# Patient Record
Sex: MALE | Race: OTHER
[De-identification: names, ages, dates, MRNs, and addresses within clinical notes are randomized per-mention and may not be internally consistent; named-entity substitution may affect disease eponyms.]

---

## 2018-05-21 ENCOUNTER — HOSPITAL ENCOUNTER (INPATIENT)
Dept: HOSPITAL 72 - EMR | Age: 53
LOS: 7 days | Discharge: HOME | DRG: 720 | End: 2018-05-28
Payer: COMMERCIAL

## 2018-05-21 VITALS — HEIGHT: 68 IN | BODY MASS INDEX: 27.58 KG/M2 | WEIGHT: 182 LBS

## 2018-05-21 DIAGNOSIS — J18.9: ICD-10-CM

## 2018-05-21 DIAGNOSIS — E75.5: ICD-10-CM

## 2018-05-21 DIAGNOSIS — A41.9: Primary | ICD-10-CM

## 2018-05-21 DIAGNOSIS — J98.4: ICD-10-CM

## 2018-05-21 DIAGNOSIS — F17.200: ICD-10-CM

## 2018-05-21 PROCEDURE — 86039 ANTINUCLEAR ANTIBODIES (ANA): CPT

## 2018-05-21 PROCEDURE — 86703 HIV-1/HIV-2 1 RESULT ANTBDY: CPT

## 2018-05-21 PROCEDURE — 99285 EMERGENCY DEPT VISIT HI MDM: CPT

## 2018-05-21 PROCEDURE — 82550 ASSAY OF CK (CPK): CPT

## 2018-05-21 PROCEDURE — 84484 ASSAY OF TROPONIN QUANT: CPT

## 2018-05-21 PROCEDURE — 80202 ASSAY OF VANCOMYCIN: CPT

## 2018-05-21 PROCEDURE — 85025 COMPLETE CBC W/AUTO DIFF WBC: CPT

## 2018-05-21 PROCEDURE — 87385 HISTOPLASMA CAPSUL AG IA: CPT

## 2018-05-21 PROCEDURE — 86140 C-REACTIVE PROTEIN: CPT

## 2018-05-21 PROCEDURE — 87205 SMEAR GRAM STAIN: CPT

## 2018-05-21 PROCEDURE — 81003 URINALYSIS AUTO W/O SCOPE: CPT

## 2018-05-21 PROCEDURE — 83036 HEMOGLOBIN GLYCOSYLATED A1C: CPT

## 2018-05-21 PROCEDURE — 85651 RBC SED RATE NONAUTOMATED: CPT

## 2018-05-21 PROCEDURE — 86021 WBC ANTIBODY IDENTIFICATION: CPT

## 2018-05-21 PROCEDURE — 36415 COLL VENOUS BLD VENIPUNCTURE: CPT

## 2018-05-21 PROCEDURE — 87040 BLOOD CULTURE FOR BACTERIA: CPT

## 2018-05-21 PROCEDURE — 87449 NOS EACH ORGANISM AG IA: CPT

## 2018-05-21 PROCEDURE — 87116 MYCOBACTERIA CULTURE: CPT

## 2018-05-21 PROCEDURE — 86738 MYCOPLASMA ANTIBODY: CPT

## 2018-05-21 PROCEDURE — 87070 CULTURE OTHR SPECIMN AEROBIC: CPT

## 2018-05-21 PROCEDURE — 86606 ASPERGILLUS ANTIBODY: CPT

## 2018-05-21 PROCEDURE — 80053 COMPREHEN METABOLIC PANEL: CPT

## 2018-05-21 PROCEDURE — 82164 ANGIOTENSIN I ENZYME TEST: CPT

## 2018-05-21 PROCEDURE — 85730 THROMBOPLASTIN TIME PARTIAL: CPT

## 2018-05-21 PROCEDURE — 87556 M.TUBERCULO DNA AMP PROBE: CPT

## 2018-05-21 PROCEDURE — 80048 BASIC METABOLIC PNL TOTAL CA: CPT

## 2018-05-21 PROCEDURE — 83605 ASSAY OF LACTIC ACID: CPT

## 2018-05-21 PROCEDURE — 71045 X-RAY EXAM CHEST 1 VIEW: CPT

## 2018-05-21 PROCEDURE — 82553 CREATINE MB FRACTION: CPT

## 2018-05-21 PROCEDURE — 80061 LIPID PANEL: CPT

## 2018-05-21 PROCEDURE — 94664 DEMO&/EVAL PT USE INHALER: CPT

## 2018-05-21 PROCEDURE — 85610 PROTHROMBIN TIME: CPT

## 2018-05-21 PROCEDURE — 71275 CT ANGIOGRAPHY CHEST: CPT

## 2018-05-21 PROCEDURE — 86635 COCCIDIOIDES ANTIBODY: CPT

## 2018-05-21 PROCEDURE — 93005 ELECTROCARDIOGRAM TRACING: CPT

## 2018-05-21 PROCEDURE — 93306 TTE W/DOPPLER COMPLETE: CPT

## 2018-05-22 VITALS — SYSTOLIC BLOOD PRESSURE: 98 MMHG | DIASTOLIC BLOOD PRESSURE: 62 MMHG

## 2018-05-22 VITALS — SYSTOLIC BLOOD PRESSURE: 128 MMHG | DIASTOLIC BLOOD PRESSURE: 66 MMHG

## 2018-05-22 VITALS — SYSTOLIC BLOOD PRESSURE: 105 MMHG | DIASTOLIC BLOOD PRESSURE: 70 MMHG

## 2018-05-22 VITALS — DIASTOLIC BLOOD PRESSURE: 71 MMHG | SYSTOLIC BLOOD PRESSURE: 100 MMHG

## 2018-05-22 VITALS — DIASTOLIC BLOOD PRESSURE: 74 MMHG | SYSTOLIC BLOOD PRESSURE: 110 MMHG

## 2018-05-22 VITALS — SYSTOLIC BLOOD PRESSURE: 111 MMHG | DIASTOLIC BLOOD PRESSURE: 71 MMHG

## 2018-05-22 VITALS — DIASTOLIC BLOOD PRESSURE: 72 MMHG | SYSTOLIC BLOOD PRESSURE: 107 MMHG

## 2018-05-22 VITALS — SYSTOLIC BLOOD PRESSURE: 112 MMHG | DIASTOLIC BLOOD PRESSURE: 71 MMHG

## 2018-05-22 VITALS — SYSTOLIC BLOOD PRESSURE: 107 MMHG | DIASTOLIC BLOOD PRESSURE: 68 MMHG

## 2018-05-22 LAB
ADD MANUAL DIFF: NO
ALBUMIN SERPL-MCNC: 3.4 G/DL (ref 3.4–5)
ALBUMIN/GLOB SERPL: 0.8 {RATIO} (ref 1–2.7)
ALP SERPL-CCNC: 77 U/L (ref 46–116)
ALT SERPL-CCNC: 24 U/L (ref 12–78)
ANION GAP SERPL CALC-SCNC: 11 MMOL/L (ref 5–15)
APPEARANCE UR: CLEAR
APTT PPP: YELLOW S
AST SERPL-CCNC: 21 U/L (ref 15–37)
BASOPHILS NFR BLD AUTO: 0.7 % (ref 0–2)
BILIRUB SERPL-MCNC: 0.6 MG/DL (ref 0.2–1)
BUN SERPL-MCNC: 24 MG/DL (ref 7–18)
CALCIUM SERPL-MCNC: 9.1 MG/DL (ref 8.5–10.1)
CHLORIDE SERPL-SCNC: 104 MMOL/L (ref 98–107)
CHOLEST SERPL-MCNC: 130 MG/DL (ref ?–200)
CK MB SERPL-MCNC: 0.5 NG/ML (ref 0–3.6)
CK SERPL-CCNC: 45 U/L (ref 26–308)
CO2 SERPL-SCNC: 23 MMOL/L (ref 21–32)
CREAT SERPL-MCNC: 1 MG/DL (ref 0.55–1.3)
EOSINOPHIL NFR BLD AUTO: 0.9 % (ref 0–3)
ERYTHROCYTE [DISTWIDTH] IN BLOOD BY AUTOMATED COUNT: 10.5 % (ref 11.6–14.8)
GLOBULIN SER-MCNC: 4.5 G/DL
GLUCOSE UR STRIP-MCNC: NEGATIVE MG/DL
HCT VFR BLD CALC: 40.1 % (ref 42–52)
HDLC SERPL-MCNC: 24 MG/DL (ref 40–60)
HGB BLD-MCNC: 13.9 G/DL (ref 14.2–18)
KETONES UR QL STRIP: NEGATIVE
LEUKOCYTE ESTERASE UR QL STRIP: NEGATIVE
LYMPHOCYTES NFR BLD AUTO: 16.6 % (ref 20–45)
MCV RBC AUTO: 95 FL (ref 80–99)
MONOCYTES NFR BLD AUTO: 7.6 % (ref 1–10)
NEUTROPHILS NFR BLD AUTO: 74.2 % (ref 45–75)
NITRITE UR QL STRIP: NEGATIVE
PH UR STRIP: 5 [PH] (ref 4.5–8)
PLATELET # BLD: 320 K/UL (ref 150–450)
POTASSIUM SERPL-SCNC: 4.3 MMOL/L (ref 3.5–5.1)
PROT UR QL STRIP: NEGATIVE
RBC # BLD AUTO: 4.21 M/UL (ref 4.7–6.1)
SODIUM SERPL-SCNC: 138 MMOL/L (ref 136–145)
SP GR UR STRIP: 1.02 (ref 1–1.03)
TRIGL SERPL-MCNC: 91 MG/DL (ref 30–150)
UROBILINOGEN UR-MCNC: NORMAL MG/DL (ref 0–1)
WBC # BLD AUTO: 14.6 K/UL (ref 4.8–10.8)

## 2018-05-22 RX ADMIN — HYDROCODONE BITARTRATE AND ACETAMINOPHEN PRN TAB: 10; 325 TABLET ORAL at 21:08

## 2018-05-22 RX ADMIN — HEPARIN SODIUM SCH UNITS: 5000 INJECTION INTRAVENOUS; SUBCUTANEOUS at 21:10

## 2018-05-22 RX ADMIN — DEXTROSE MONOHYDRATE SCH MLS/HR: 50 INJECTION, SOLUTION INTRAVENOUS at 14:21

## 2018-05-22 RX ADMIN — GUAIFENESIN SCH MG: 600 TABLET, EXTENDED RELEASE ORAL at 17:05

## 2018-05-22 RX ADMIN — HEPARIN SODIUM SCH UNITS: 5000 INJECTION INTRAVENOUS; SUBCUTANEOUS at 09:36

## 2018-05-22 RX ADMIN — GUAIFENESIN SCH MG: 600 TABLET, EXTENDED RELEASE ORAL at 09:35

## 2018-05-22 NOTE — CARDIOLOGY REPORT
--------------- APPROVED REPORT --------------





EXAM: Two-dimensional and M-mode echocardiogram with Doppler and color 

Doppler.



INDICATION

Chest Pain 



M-Mode DIMENSIONS 

IVSd0.9 (0.7-1.1cm)Left Atrium (MM)4.5 (1.6-4.0cm)

LVDd5.4 (3.5-5.6cm)Aortic Root2.7 (2.0-3.7cm)

PWd1.0 (0.7-1.1cm)Aortic Cusp Exc.1.8 (1.5-2.0cm)

IVSs0.9 cm

LVDs3.8 (2.5-4.0cm)

PWs1.1 cm





Technically  difficult study due to poor acoustical windows.

Normal left ventricular chamber size, systolic function and wall motion.

Left ventricular ejection fraction estimated to be 60-65%.

No evidence of  left ventricular hypertrophy.

No evidence of pericardial or pleural effusion.

All other cardiac chamber sizes are within normal limits.

Focal aortic valve sclerosis with adequate cusp excursion.

Thickened mitral valve leaflets with normal excursion.

Mild mitral annulus and aortic root calcification.

Pulmonic valve not well visualized.

Normal tricuspid valve structure.

IVC is not clearly seen and  may be underfilled suggestive of low RA pressure 



A  color flow and spectral Doppler study was performed and revealed:

No aortic regurgitation.

No mitral regurgitation.

Mitral diastolic velocities suggest reduced left ventricular relaxation c/w diastolic 

dysfunction grade 1.

No tricuspid regurgitation.

Pulmonic regurgitation present.

## 2018-05-22 NOTE — DIAGNOSTIC IMAGING REPORT
Indication: Shortness of breath

 

Technique: One view of the chest

 

Comparison: none

 

Findings: Inspiration is suboptimal. Atelectatic changes are seen at both lung bases.

There is a mass with subtle central cavitation seen in the right midlung. There is

generalized mild interstitial prominence. Heart size is upper limits of normal.

 

Impression: Right midlung mass with subtle central cavitation, also described on

subsequent chest CT. Neoplastic versus inflammatory

 

Hypoventilatory exam with bilateral basilar atelectasis

 

Nonspecific mild bilateral diffuse interstitial prominence

## 2018-05-22 NOTE — CONSULTATION
Consult Note


Consult Note


PULMONARY & CRITICAL CARE





CC: Lung mass/PNA





HPI: 53 M current daily smoker p/w one week of cough congestion and SOB.  He 

initially went to an UC and was Rx'd Levaquin to no avail.  Tm 101.2, WCT 14, 

CT with RLL cavitary mass. 


No F/C, no HA, no dizziness, no weight loss, no hemoptysis, no N/V/D/C, no 

abdominal pain or urinary complaints. 





PMH: None


PSH: None


ALL: NKDA





Active Scripts








 Medications  Dose


 Route/Sig


 Max Daily Dose Days Date Category


 


 Phenergan*


  (Promethazine


 HCl) 25 Mg Tablet  Unknown Dose


 ORAL


    5/22/18 Reported


 


 Zyrtec*


  (Cetirizine HCl)


 10 Mg Tablet  Unknown Dose


 ORAL DAILY


    5/22/18 Reported


 


 Motrin*


  (Ibuprofen) 600


 Mg Tablet  Unknown Dose


 ORAL


    5/22/18 Reported


 


 Levaquin*


  (Levofloxacin)


 250 Mg Tablet  Unknown Dose


 ORAL DAILY


    5/22/18 Reported





SHx: 1/3 PPD X > 30 years, no drugs, no EtOH, from [place], works in [employer]


RHx: N/C


ROS: Neg other than HPI





PE: 





Last 24 Hour Vital Signs








  Date Time  Temp Pulse Resp B/P (MAP) Pulse Ox O2 Delivery O2 Flow Rate FiO2


 


5/22/18 08:01 98.0 73 19 98/62 93 Room Air  





 98.0       


 


5/22/18 08:00 98.2 79 15 105/70 98 Room Air  





 98.2       


 


5/22/18 06:50  73 19 98/62 93 Room Air  


 


5/22/18 05:30  79 20 100/71 94 Room Air  


 


5/22/18 04:13 98.0       


 


5/22/18 03:30 98.0 82 19 107/68 95 Room Air  





 98.0       


 


5/22/18 02:11 101.2       


 


5/22/18 02:00 99.2 79 18 107/72 96 Room Air  





 99.2       


 


5/22/18 01:42 99.2       


 


5/22/18 00:43 101.2       


 


5/22/18 00:42 101.2       


 


5/22/18 00:10  88 33   Room Air  


 


5/22/18 00:00 101.2 88 33 112/71 97 Room Air  





 101.2       


 


5/21/18 23:44 100.3 93 18 124/84 98 Room Air  





 100.2       





NAD, AAOX3


NC/AT, OPC c MMM


Supple s LAD or JVD


Scattered coarse, RLL rhonchi


RRR


S/NT/ND c NABS


No C/C/E





5/21/18 CT-A CHEST: 


Impression: Suboptimal opacification of the pulmonary arteries, distal emboli 

could


be missed. No evidence of large vessel central pulmonary embolus demonstrate


4.5 x 3.9 x 4 cm cavitary mass in the periphery of the right lower lobe. This 

may be


either neoplastic or inflammatory


Right hilar lymphadenopathy, likely related to the above. This could likewise be


neoplastic or inflammatory


Bilateral reticular interstitial and groundglass opacities. Nonspecific, 

suspect on


the basis of pulmonary edema, but could also be infectious or noninfectious


inflammator


Tracright pleural effusion


Borderline cardiomegaly


prince questionable cholelithiasis incidentally noted


Assessment/Plan


ASSESSMENT: 


* 4 cm RLL cavitary mass with R hilar LAD, CAP vs neoplasm


* Extensive daily smoker


* B GGO's and CM


* Periorbital Xanthomas





PLAN: 


* R/O TB with AFB x 3


* Sputum Cx


* Atypical serologies sent


* Zosyn (D1)


* PRN HHN's


* CTGBx


* Optimize pulmonary hygiene/mobilize as tolerated


* Nicotine patch


* Discuss tobacco cessation


* Monitor volumes


* TTE


* HbA1C, lipid panel


* DVT Px: Hep SQ


* FC





Julian Abreu MD, [facility]


Pulmonary & Critical Care Medicine











JULIAN ABREU M.D. May 22, 2018 10:28

## 2018-05-22 NOTE — INFECTIOUS DISEASES PROG NOTE
Assessment/Plan


Assessment/Plan








Full consult dictated:





A)


   1) pneumonia vs neoplasm, ? cap, ? atypical, ? fungal, ? tuberculosis, ? mrsa

, ? post-obstructive/anaerobic


   2) ? sepsis, leukocytosis, fevers


   3) pmh noted


   4) allergies - nkda





P)


   1) zosyn and vancomycin, azithromycin


   2) check sputum cultures, check biopsy, check serology, f/u lab, f/u chest x-

ray


   3) d/w Dr. Abreu about abx


   4) thank you





Subjective


Allergies:  


Coded Allergies:  


     No Known Allergies (Unverified , 5/21/18)





Objective


Vital Signs





Last 24 Hour Vital Signs








  Date Time  Temp Pulse Resp B/P (MAP) Pulse Ox O2 Delivery O2 Flow Rate FiO2


 


5/22/18 18:15 100.4       


 


5/22/18 17:16 100.9       


 


5/22/18 16:00 100.9 85 18 111/71 85 Room Air  





 100.9       


 


5/22/18 12:12  82 16   Room Air  21


 


5/22/18 12:00 98.2 80 16 110/74 98 Room Air  





 98.2       


 


5/22/18 08:01 98.0 73 19 98/62 93 Room Air  





 98.0       


 


5/22/18 08:00 98.2 79 15 105/70 98 Room Air  





 98.2       


 


5/22/18 06:50  73 19 98/62 93 Room Air  


 


5/22/18 05:30  79 20 100/71 94 Room Air  


 


5/22/18 04:13 98.0       


 


5/22/18 03:30 98.0 82 19 107/68 95 Room Air  





 98.0       


 


5/22/18 02:11 101.2       


 


5/22/18 02:00 99.2 79 18 107/72 96 Room Air  





 99.2       


 


5/22/18 01:42 99.2       


 


5/22/18 00:43 101.2       


 


5/22/18 00:42 101.2       


 


5/22/18 00:10  88 33   Room Air  


 


5/22/18 00:00 101.2 88 33 112/71 97 Room Air  





 101.2       


 


5/21/18 23:44 100.3 93 18 124/84 98 Room Air  





 100.2       








Height (Feet):  5


Height (Inches):  8.00


Weight (Pounds):  182





Laboratory Tests








Test


  5/22/18


00:30 5/22/18


01:55 5/22/18


08:16 5/22/18


11:00


 


White Blood Count


  14.6 K/UL


(4.8-10.8)  H 


  


  


 


 


Red Blood Count


  4.21 M/UL


(4.70-6.10)  L 


  


  


 


 


Hemoglobin


  13.9 G/DL


(14.2-18.0)  L 


  


  


 


 


Hematocrit


  40.1 %


(42.0-52.0)  L 


  


  


 


 


Mean Corpuscular Volume 95 FL (80-99)     


 


Mean Corpuscular Hemoglobin


  33.1 PG


(27.0-31.0)  H 


  


  


 


 


Mean Corpuscular Hemoglobin


Concent 34.7 G/DL


(32.0-36.0) 


  


  


 


 


Red Cell Distribution Width


  10.5 %


(11.6-14.8)  L 


  


  


 


 


Platelet Count


  320 K/UL


(150-450) 


  


  


 


 


Mean Platelet Volume


  8.0 FL


(6.5-10.1) 


  


  


 


 


Neutrophils (%) (Auto)


  74.2 %


(45.0-75.0) 


  


  


 


 


Lymphocytes (%) (Auto)


  16.6 %


(20.0-45.0)  L 


  


  


 


 


Monocytes (%) (Auto)


  7.6 %


(1.0-10.0) 


  


  


 


 


Eosinophils (%) (Auto)


  0.9 %


(0.0-3.0) 


  


  


 


 


Basophils (%) (Auto)


  0.7 %


(0.0-2.0) 


  


  


 


 


Sodium Level


  138 MMOL/L


(136-145) 


  


  


 


 


Potassium Level


  4.3 MMOL/L


(3.5-5.1) 


  


  


 


 


Chloride Level


  104 MMOL/L


() 


  


  


 


 


Carbon Dioxide Level


  23 MMOL/L


(21-32) 


  


  


 


 


Anion Gap


  11 mmol/L


(5-15) 


  


  


 


 


Blood Urea Nitrogen


  24 mg/dL


(7-18)  H 


  


  


 


 


Creatinine


  1.0 MG/DL


(0.55-1.30) 


  


  


 


 


Estimat Glomerular Filtration


Rate > 60 mL/min


(>60) 


  


  


 


 


Glucose Level


  123 MG/DL


()  H 


  


  


 


 


Lactic Acid Level


  1.10 mmol/L


(0.66-2.22) 


  


  


 


 


Calcium Level


  9.1 MG/DL


(8.5-10.1) 


  


  


 


 


Total Bilirubin


  0.6 MG/DL


(0.2-1.0) 


  


  


 


 


Aspartate Amino Transf


(AST/SGOT) 21 U/L (15-37)


  


  


  


 


 


Alanine Aminotransferase


(ALT/SGPT) 24 U/L (12-78)


  


  


  


 


 


Alkaline Phosphatase


  77 U/L


() 


  


  


 


 


Total Creatine Kinase


  45 U/L


() 


  


  


 


 


Creatine Kinase MB


  0.5 NG/ML


(0.0-3.6) 


  


  


 


 


Creatine Kinase MB Relative


Index 1.1  


  


  


  


 


 


Troponin I


  0.000 ng/mL


(0.000-0.056) 


  


  


 


 


Total Protein


  7.9 G/DL


(6.4-8.2) 


  


  


 


 


Albumin


  3.4 G/DL


(3.4-5.0) 


  


  


 


 


Globulin 4.5 g/dL     


 


Albumin/Globulin Ratio


  0.8 (1.0-2.7)


L 


  


  


 


 


Urine Color  Yellow    


 


Urine Appearance  Clear    


 


Urine pH  5 (4.5-8.0)    


 


Urine Specific Gravity


  


  1.020


(1.005-1.035) 


  


 


 


Urine Protein


  


  Negative


(NEGATIVE) 


  


 


 


Urine Glucose (UA)


  


  Negative


(NEGATIVE) 


  


 


 


Urine Ketones


  


  Negative


(NEGATIVE) 


  


 


 


Urine Occult Blood


  


  Negative


(NEGATIVE) 


  


 


 


Urine Nitrite


  


  Negative


(NEGATIVE) 


  


 


 


Urine Bilirubin


  


  Negative


(NEGATIVE) 


  


 


 


Urine Urobilinogen


  


  Normal MG/DL


(0.0-1.0) 


  


 


 


Urine Leukocyte Esterase


  


  Negative


(NEGATIVE) 


  


 


 


Erythrocyte Sedimentation Rate


  


  


  63 MM/HR


(0-20)  H 


 


 


Hemoglobin A1c


  


  


  


  4.9 %


(4.3-6.0)


 


C-Reactive Protein,


Quantitative 


  


  


  7.0 mg/dL


(0.00-0.90)  H


 


Triglycerides Level


  


  


  


  91 MG/DL


()


 


Cholesterol Level


  


  


  


  130 MG/DL (<


200)


 


LDL Cholesterol


  


  


  


  90 mg/dL


(<100)


 


HDL Cholesterol


  


  


  


  24 MG/DL


(40-60)  L


 


Cholesterol/HDL Ratio


  


  


  


  5.4 (3.3-4.4)


H


 


Anti-Nuclear Antibody Screen    Pending  


 


c-ANCA Titer    Pending  


 


p-ANCA Titer    Pending  


 


Coccidioides Antibody (Comp


Fix) 


  


  


  Pending  


 


 


Mycoplasma pneumoniae IgG


Antibody 


  


  


  Pending  


 


 


Mycoplasma pneumoniae IgM Ab


Titer 


  


  


  Pending  


 


 


Aspergillus flavus Antibody    Pending  


 


Aspergillus fumigatus Antibody    Pending  


 


Aspergillus niger Antibody    Pending  


 


TB Test (T-Spot)    Pending  


 


TB Test Nil Control (T-Spot)    Pending  


 


TB Test Panel A (T-Spot)    Pending  


 


TB Test Panel B (T-Spot)    Pending  


 


TB Test Positive Control


(T-Spot) 


  


  


  Pending  


 


 


Test


  5/22/18


11:50 5/22/18


11:51 5/22/18


16:20 


 


 


Histoplasma Antigen Pending     


 


Urine Legionella Antigen Pending     


 


M. tuberculosis Complex DNA


(PCR) 


  Pending  


  


  


 


 


Troponin I


  


  


  0.000 ng/mL


(0.000-0.056) 


 











Current Medications








 Medications


  (Trade)  Dose


 Ordered  Sig/Yasmani


 Route


 PRN Reason  Start Time


 Stop Time Status Last Admin


Dose Admin


 


 Acetaminophen


  (Tylenol)  650 mg  Q6H  PRN


 ORAL


 Mild Pain/Temp > 100.5  5/22/18 12:45


 6/21/18 12:44  5/22/18 17:16


 


 


 Acetaminophen/


 Hydrocodone Bitart


  (Norco 10/325)  1 tab  Q4H  PRN


 ORAL


 Moderate Pain (Pain Scale 4-6)  5/22/18 12:45


 5/29/18 12:44   


 


 


 Albuterol/


 Ipratropium


  (Albuterol/


 Ipratropium)  3 ml  Q4H  PRN


 HHN


 Shortness of Breath  5/22/18 08:30


 5/27/18 08:29   


 


 


 Guaifenesin


  (Mucinex ER)  600 mg  TWICE A  DAY


 ORAL


   5/22/18 09:00


 6/21/18 08:59  5/22/18 17:05


 


 


 Guaifenesin


  (Robitussin)  100 mg  Q4H  PRN


 ORAL


 For Cough  5/22/18 08:30


 6/21/18 08:29   


 


 


 Heparin Sodium


  (Porcine)


  (Heparin 5000


 units/ml)  5,000 units  EVERY 12  HOURS


 SUBQ


   5/22/18 09:00


 6/21/18 08:59  5/22/18 09:36


 


 


 Iopamidol


  (Isovue-370


 150ml)  150 ml  NOW  PRN


 INJ


 Radiology Procedure  5/22/18 01:30


 5/24/18 01:19   


 


 


 Lidocaine HCl


  (Xylocaine 1%


 30ml)  30 ml  NOW  PRN


 INJ


 Radiology Procedure  5/22/18 10:30


 5/22/18 23:59   


 


 


 Lorazepam


  (Ativan)  2 mg  Q6H  PRN


 ORAL


 For Anxiety  5/22/18 12:15


 5/29/18 12:14   


 


 


 Morphine Sulfate


  (Morphine


 Sulfate)  4 mg  Q4H  PRN


 IVP


 Severe Pain (Pain Scale 7-10)  5/22/18 12:45


 5/29/18 12:44   


 


 


 Nicotine


  (Nicoderm)  1 patch  Q24H


 TDERMAL


   5/22/18 12:00


 6/21/18 11:59  5/22/18 12:38


 


 


 Piperacillin Sod/


 Tazobactam Sod


 3.375 gm/Sodium


 Chloride  110 ml @ 


 27.5 mls/hr  EVERY 8  HOURS


 IVPB


   5/22/18 14:00


 5/27/18 13:59  5/22/18 14:21


 

















QIAN VILLA May 22, 2018 20:11

## 2018-05-22 NOTE — DIAGNOSTIC IMAGING REPORT
ndication: Shortness of breath

 

Technique: IV administration nonionic contrast. Spiral acquisitions obtained from the

lung bases to the lung apices. Multiplanar and 3-D reconstructions were generated.

Total dose length product 961.62 mGycm. CTDIvol(s) 24.9 mGy.  Dose reduction achieved

using automated exposure control

 

 

Comparison: none

 

Findings: Pulmonary arterial opacification is suboptimal, precluding confident

exclusion of small distal emboli. No evidence of thoracic aortic aneurysm or

dissection. No pulmonary arterial dilatation. No evidence of right ventricular

dilatation. There is borderline four-chamber cardiomegaly noted.

 

There is a mass in the periphery of the right lower lobe which measures 4.5 cm AP by

3.9 cm transverse by 4 cm craniocaudad. This demonstrates fluid and cavitation

centrally.

 

Infiltrates, groundglass opacities, and atelectasis are seen in both lower lobes.

There is generalized mild interstitial septal thickening. Inspiration is suboptimal.

There is a trace right pleural effusion

 

No pericardial effusion demonstrated. There is right hilar lymphadenopathy, with a

node measuring 2 cm long axis dimension. No mediastinal lymphadenopathy. Esophagus is

unremarkable. The visualized portions of the thyroid are unremarkable. No axillary or

chest wall mass or adenopathy.

 

The included upper abdominal anatomy demonstrates possible subtle gallstones. There

is a retroaortic left renal vein.

 

Impression: Suboptimal opacification of the pulmonary arteries, distal emboli could

be missed. No evidence of large vessel central pulmonary embolus demonstrated

 

4.5 x 3.9 x 4 cm cavitary mass in the periphery of the right lower lobe. This may be

either neoplastic or inflammatory.

 

Right hilar lymphadenopathy, likely related to the above. This could likewise be

neoplastic or inflammatory.

 

Bilateral reticular interstitial and groundglass opacities. Nonspecific, suspect on

the basis of pulmonary edema, but could also be infectious or noninfectious

inflammatory

 

Trace right pleural effusion

 

Borderline cardiomegaly

 

Very questionable cholelithiasis incidentally noted

 

This agrees with the preliminary interpretation provided overnight by [facility]

teleradiology service.

 

The CT scanner at [facility] is accredited by the American College of

Radiology and the scans are performed using protocols designed to limit radiation

exposure to as low as reasonably achievable to attain images of sufficient resolution

adequate for diagnostic evaluation.

## 2018-05-22 NOTE — EMERGENCY ROOM REPORT
History of Present Illness


General


Chief Complaint:  Upper Respiratory Illness


Source:  Patient





Present Illness


HPI


Is a 53-year-old male with no past medical history per patient.  He presents 

with chief complaint is right-sided chest pain.  He's been coughing and 

congested for 10 days.  Saw a physician and prescribed Levaquin, Motrin and 

cough medicine.  Not getting better.  Pain is 10 out of 10 with inspiration or 

coughing.  Denies any fever chills.  Coughing is productive of phlegm.  No 

radiation.  Pain is sharp.


Allergies:  


Coded Allergies:  


     No Known Allergies (Unverified , 5/21/18)





Patient History


Past Medical History:  see triage record, old chart reviewed


Past Surgical History:  none


Pertinent Family History:  none


Social History:  Denies: smoking


Immunizations:  other


Reviewed Nursing Documentation:  PMH: Agreed; PSxH: Agreed





Nursing Documentation-PMH


Past Medical History:  No Stated History





Review of Systems


Eye:  Denies: eye pain, blurred vision


ENT:  Denies: ear pain, nose congestion, throat swelling


Respiratory:  Reports: cough; Denies: shortness of breath


Cardiovascular:  Reports: chest pain; Denies: palpitations


Gastrointestinal:  Denies: abdominal pain, diarrhea, nausea, vomiting


Musculoskeletal:  Denies: back pain, joint pain


Skin:  Denies: rash


Neurological:  Denies: headache, numbness


Endocrine:  Denies: increased thirst, increased urine


Hematologic/Lymphatic:  Denies: easy bruising


All Other Systems:  negative except mentioned in HPI





Physical Exam





Vital Signs








  Date Time  Temp Pulse Resp B/P (MAP) Pulse Ox O2 Delivery O2 Flow Rate FiO2


 


5/21/18 23:44 100.3 93 18 124/84 98 Room Air  





 100.2       





vitals with fever


Sp02 EP Interpretation:  reviewed, normal


General Appearance:  well appearing, no apparent distress, alert


Head:  normocephalic, atraumatic


Eyes:  bilateral eye PERRL, bilateral eye EOMI


ENT:  hearing grossly normal, normal pharynx


Neck:  full range of motion, supple, no meningismus


Respiratory:  chest non-tender, decreased breath sounds, rhonchi - right lungs


Cardiovascular #1:  regular rate, rhythm, no murmur


Gastrointestinal:  normal bowel sounds, non tender, no mass, no organomegaly, 

no bruit, non-distended


Musculoskeletal:  back normal, gait/station normal, normal range of motion


Psychiatric:  mood/affect normal


Skin:  warm/dry





Medical Decision Making


Diagnostic Impression:  


 Primary Impression:  


 Community acquired bacterial pneumonia


 Additional Impression:  


 Cavitary pneumonia


ER Course


Patient presents with cavitary pneumonia of the right lung.  He has been on 

Levaquin for several days.  I switched him to Rocephin and azithromycin.  CT 

findings concerning for neoplastic process, possible TB, possible other 

granulomatosis infection.  Will admit the patient in isolation.  I contacted 

Dr. Abreu for admission.





Laboratory Tests








Test


  5/22/18


00:30 5/22/18


01:55


 


White Blood Count


  14.6 K/UL


(4.8-10.8)  H 


 


 


Red Blood Count


  4.21 M/UL


(4.70-6.10)  L 


 


 


Hemoglobin


  13.9 G/DL


(14.2-18.0)  L 


 


 


Hematocrit


  40.1 %


(42.0-52.0)  L 


 


 


Mean Corpuscular Volume 95 FL (80-99)   


 


Mean Corpuscular Hemoglobin


  33.1 PG


(27.0-31.0)  H 


 


 


Mean Corpuscular Hemoglobin


Concent 34.7 G/DL


(32.0-36.0) 


 


 


Red Cell Distribution Width


  10.5 %


(11.6-14.8)  L 


 


 


Platelet Count


  320 K/UL


(150-450) 


 


 


Mean Platelet Volume


  8.0 FL


(6.5-10.1) 


 


 


Neutrophils (%) (Auto)


  74.2 %


(45.0-75.0) 


 


 


Lymphocytes (%) (Auto)


  16.6 %


(20.0-45.0)  L 


 


 


Monocytes (%) (Auto)


  7.6 %


(1.0-10.0) 


 


 


Eosinophils (%) (Auto)


  0.9 %


(0.0-3.0) 


 


 


Basophils (%) (Auto)


  0.7 %


(0.0-2.0) 


 


 


Sodium Level


  138 MMOL/L


(136-145) 


 


 


Potassium Level


  4.3 MMOL/L


(3.5-5.1) 


 


 


Chloride Level


  104 MMOL/L


() 


 


 


Carbon Dioxide Level


  23 MMOL/L


(21-32) 


 


 


Anion Gap


  11 mmol/L


(5-15) 


 


 


Blood Urea Nitrogen


  24 mg/dL


(7-18)  H 


 


 


Creatinine


  1.0 MG/DL


(0.55-1.30) 


 


 


Estimat Glomerular Filtration


Rate > 60 mL/min


(>60) 


 


 


Glucose Level


  123 MG/DL


()  H 


 


 


Lactic Acid Level


  1.10 mmol/L


(0.66-2.22) 


 


 


Calcium Level


  9.1 MG/DL


(8.5-10.1) 


 


 


Total Bilirubin


  0.6 MG/DL


(0.2-1.0) 


 


 


Aspartate Amino Transf


(AST/SGOT) 21 U/L (15-37)


  


 


 


Alanine Aminotransferase


(ALT/SGPT) 24 U/L (12-78)


  


 


 


Alkaline Phosphatase


  77 U/L


() 


 


 


Total Creatine Kinase


  45 U/L


() 


 


 


Creatine Kinase MB


  0.5 NG/ML


(0.0-3.6) 


 


 


Creatine Kinase MB Relative


Index 1.1  


  


 


 


Troponin I


  0.000 ng/mL


(0.000-0.056) 


 


 


Total Protein


  7.9 G/DL


(6.4-8.2) 


 


 


Albumin


  3.4 G/DL


(3.4-5.0) 


 


 


Globulin 4.5 g/dL   


 


Albumin/Globulin Ratio


  0.8 (1.0-2.7)


L 


 


 


Urine Color  Yellow  


 


Urine Appearance  Clear  


 


Urine pH  5 (4.5-8.0)  


 


Urine Specific Gravity


  


  1.020


(1.005-1.035)


 


Urine Protein


  


  Negative


(NEGATIVE)


 


Urine Glucose (UA)


  


  Negative


(NEGATIVE)


 


Urine Ketones


  


  Negative


(NEGATIVE)


 


Urine Occult Blood


  


  Negative


(NEGATIVE)


 


Urine Nitrite


  


  Negative


(NEGATIVE)


 


Urine Bilirubin


  


  Negative


(NEGATIVE)


 


Urine Urobilinogen


  


  Normal MG/DL


(0.0-1.0)


 


Urine Leukocyte Esterase


  


  Negative


(NEGATIVE)








Lab Results Impression


labs with leukocytosis


EKG Diagnostic Results


Rate:  normal


Rhythm:  NSR


ST Segments:  no acute changes





Rhythm Strip Diag. Results


Rhythm Strip Time:  02:04


EP Interpretation:  yes


Rate:  82


Rhythm:  NSR, no PVC's, no ectopy





Chest X-Ray Diagnostic Results


Chest X-Ray Diagnostic Results :  


   Chest X-Ray Ordered:  Yes


   # of Views/Limited/Complete:  1 View


   Indication:  Chest Pain


   EP Interpretation:  Yes


   Interpretation:  no effusion, no pneumothorax, other - right lower lobe 

infiltrate


   Impression:  Other - right lower lobe infiltrate


   Electronically Signed by:  Luke Waller MD





CT/MRI/US Diagnostic Results


CT/MRI/US Diagnostic Results :  


   Imaging Test Ordered:  CT chest


   Impression


Read by radiologist.  There is a focal consolidation of the right lower lobe 

with central cavitary measuring up to 4.9 cm.





Last Vital Signs








  Date Time  Temp Pulse Resp B/P (MAP) Pulse Ox O2 Delivery O2 Flow Rate FiO2


 


5/22/18 00:43 101.2       


 


5/22/18 00:10  88 33   Room Air  


 


5/22/18 00:00    112/71 97   








Status:  improved


Disposition:  ADMITTED AS INPATIENT


Condition:  Serious


Referrals:  


[facility],REFERRING (PCP)











LUKE WALLER M.D. May 22, 2018 02:05

## 2018-05-22 NOTE — HISTORY AND PHYSICAL
History of Present Illness


General


Date patient seen:  May 22, 2018


Time patient seen:  11:11


Reason for Hospitalization:  Upper Respiratory Illness





Present Illness


HPI


This is a 52 y/o male with a PMH of tobacco abuse who presents to the ER for 

right-sided chest pain. EKG was NSR and initial troponin was negative. CXR did 

show right midlung mass with subtle central cavitation c/w neoplasm vs. 

inflammation. Patient reports that he has been having productive cough for the 

last 1 week for which he was prescribed levaquin for by his PCP. In the ED, T 

was noted to be 101.4 and WBC 14. Patient was started on IV abx and IVF. CT 

chest w/ contrast was also obtained, which showed no e/o large vessel central 

pulmonary embolus. A 4.5 x 3.9 x 4 cm cavitary mass in the periphery of the 

right lower lobe. This may be either neoplastic or inflammatory. Right hilar LAD

, likely related to the above. Bilateral reticular interstitial and groundglass 

opacities. Trace right pleural effusion, borderline cardiomegaly, and 

questionable cholelithiasis were also seen on the CT. CT findings were 

discussed in depth with patient. Patient does admit to smoking 1/3 pack for the 

last 20 years. Denies any other drug use or alcohol abuse. Continues to report 

right-sided chest pain and f/c. Denies sob, n/v, abdominal pain.


Allergies:  


Coded Allergies:  


     No Known Allergies (Unverified , 5/21/18)





Medication History


Scheduled


Cetirizine Hcl* (Zyrtec*), Unknown Dose ORAL DAILY, (Reported)


Levofloxacin* (Levaquin*), Unknown Dose ORAL DAILY, (Reported)





Miscellaneous Medications


Ibuprofen* (Motrin*), Unknown Dose ORAL, (Reported)


Promethazine Hcl* (Phenergan*), Unknown Dose ORAL, (Reported)





Patient History


Healthcare decision maker





Resuscitation status


Full Code


Advanced Directive on File








Review of Systems


All Other Systems:  negative except mentioned in HPI





Physical Exam


General Appearance:  no apparent distress, alert


HEENT:  normocephalic, atraumatic


Neck:  non-tender, normal alignment, supple


Respiratory/Chest:  chest wall non-tender, no respiratory distress, decreased 

breath sounds


Cardiovascular/Chest:  normal peripheral pulses, normal rate, regular rhythm


Abdomen:  normal bowel sounds, non tender, soft


Extremities:  normal range of motion, non-tender


Skin Exam:  normal pigmentation, warm/dry


Neurologic:  CNs II-XII grossly normal, no motor/sensory deficits, alert, 

oriented x 3





Last 24 Hour Vital Signs








  Date Time  Temp Pulse Resp B/P (MAP) Pulse Ox O2 Delivery O2 Flow Rate FiO2


 


5/22/18 12:12  82 16   Room Air  21


 


5/22/18 08:01 98.0 73 19 98/62 93 Room Air  





 98.0       


 


5/22/18 08:00 98.2 79 15 105/70 98 Room Air  





 98.2       


 


5/22/18 06:50  73 19 98/62 93 Room Air  


 


5/22/18 05:30  79 20 100/71 94 Room Air  


 


5/22/18 04:13 98.0       


 


5/22/18 03:30 98.0 82 19 107/68 95 Room Air  





 98.0       


 


5/22/18 02:11 101.2       


 


5/22/18 02:00 99.2 79 18 107/72 96 Room Air  





 99.2       


 


5/22/18 01:42 99.2       


 


5/22/18 00:43 101.2       


 


5/22/18 00:42 101.2       


 


5/22/18 00:10  88 33   Room Air  


 


5/22/18 00:00 101.2 88 33 112/71 97 Room Air  





 101.2       


 


5/21/18 23:44 100.3 93 18 124/84 98 Room Air  





 100.2       

















Intake and Output  


 


 5/21/18 5/22/18





 19:00 07:00


 


Intake Total  2800 ml


 


Balance  2800 ml


 


  


 


Intake IV Total  2800 ml











Laboratory Tests








Test


  5/22/18


00:30 5/22/18


01:55 5/22/18


08:16 5/22/18


11:00


 


White Blood Count


  14.6 K/UL


(4.8-10.8)  H 


  


  


 


 


Red Blood Count


  4.21 M/UL


(4.70-6.10)  L 


  


  


 


 


Hemoglobin


  13.9 G/DL


(14.2-18.0)  L 


  


  


 


 


Hematocrit


  40.1 %


(42.0-52.0)  L 


  


  


 


 


Mean Corpuscular Volume 95 FL (80-99)     


 


Mean Corpuscular Hemoglobin


  33.1 PG


(27.0-31.0)  H 


  


  


 


 


Mean Corpuscular Hemoglobin


Concent 34.7 G/DL


(32.0-36.0) 


  


  


 


 


Red Cell Distribution Width


  10.5 %


(11.6-14.8)  L 


  


  


 


 


Platelet Count


  320 K/UL


(150-450) 


  


  


 


 


Mean Platelet Volume


  8.0 FL


(6.5-10.1) 


  


  


 


 


Neutrophils (%) (Auto)


  74.2 %


(45.0-75.0) 


  


  


 


 


Lymphocytes (%) (Auto)


  16.6 %


(20.0-45.0)  L 


  


  


 


 


Monocytes (%) (Auto)


  7.6 %


(1.0-10.0) 


  


  


 


 


Eosinophils (%) (Auto)


  0.9 %


(0.0-3.0) 


  


  


 


 


Basophils (%) (Auto)


  0.7 %


(0.0-2.0) 


  


  


 


 


Sodium Level


  138 MMOL/L


(136-145) 


  


  


 


 


Potassium Level


  4.3 MMOL/L


(3.5-5.1) 


  


  


 


 


Chloride Level


  104 MMOL/L


() 


  


  


 


 


Carbon Dioxide Level


  23 MMOL/L


(21-32) 


  


  


 


 


Anion Gap


  11 mmol/L


(5-15) 


  


  


 


 


Blood Urea Nitrogen


  24 mg/dL


(7-18)  H 


  


  


 


 


Creatinine


  1.0 MG/DL


(0.55-1.30) 


  


  


 


 


Estimat Glomerular Filtration


Rate > 60 mL/min


(>60) 


  


  


 


 


Glucose Level


  123 MG/DL


()  H 


  


  


 


 


Lactic Acid Level


  1.10 mmol/L


(0.66-2.22) 


  


  


 


 


Calcium Level


  9.1 MG/DL


(8.5-10.1) 


  


  


 


 


Total Bilirubin


  0.6 MG/DL


(0.2-1.0) 


  


  


 


 


Aspartate Amino Transf


(AST/SGOT) 21 U/L (15-37)


  


  


  


 


 


Alanine Aminotransferase


(ALT/SGPT) 24 U/L (12-78)


  


  


  


 


 


Alkaline Phosphatase


  77 U/L


() 


  


  


 


 


Total Creatine Kinase


  45 U/L


() 


  


  


 


 


Creatine Kinase MB


  0.5 NG/ML


(0.0-3.6) 


  


  


 


 


Creatine Kinase MB Relative


Index 1.1  


  


  


  


 


 


Troponin I


  0.000 ng/mL


(0.000-0.056) 


  


  


 


 


Total Protein


  7.9 G/DL


(6.4-8.2) 


  


  


 


 


Albumin


  3.4 G/DL


(3.4-5.0) 


  


  


 


 


Globulin 4.5 g/dL     


 


Albumin/Globulin Ratio


  0.8 (1.0-2.7)


L 


  


  


 


 


Urine Color  Yellow    


 


Urine Appearance  Clear    


 


Urine pH  5 (4.5-8.0)    


 


Urine Specific Gravity


  


  1.020


(1.005-1.035) 


  


 


 


Urine Protein


  


  Negative


(NEGATIVE) 


  


 


 


Urine Glucose (UA)


  


  Negative


(NEGATIVE) 


  


 


 


Urine Ketones


  


  Negative


(NEGATIVE) 


  


 


 


Urine Occult Blood


  


  Negative


(NEGATIVE) 


  


 


 


Urine Nitrite


  


  Negative


(NEGATIVE) 


  


 


 


Urine Bilirubin


  


  Negative


(NEGATIVE) 


  


 


 


Urine Urobilinogen


  


  Normal MG/DL


(0.0-1.0) 


  


 


 


Urine Leukocyte Esterase


  


  Negative


(NEGATIVE) 


  


 


 


Erythrocyte Sedimentation Rate


  


  


  63 MM/HR


(0-20)  H 


 


 


Hemoglobin A1c


  


  


  


  4.9 %


(4.3-6.0)


 


C-Reactive Protein,


Quantitative 


  


  


  7.0 mg/dL


(0.00-0.90)  H


 


Triglycerides Level


  


  


  


  91 MG/DL


()


 


Cholesterol Level


  


  


  


  130 MG/DL (<


200)


 


LDL Cholesterol


  


  


  


  90 mg/dL


(<100)


 


HDL Cholesterol


  


  


  


  24 MG/DL


(40-60)  L


 


Cholesterol/HDL Ratio


  


  


  


  5.4 (3.3-4.4)


H


 


Anti-Nuclear Antibody Screen    Pending  


 


c-ANCA Titer    Pending  


 


p-ANCA Titer    Pending  


 


Coccidioides Antibody (Comp


Fix) 


  


  


  Pending  


 


 


Mycoplasma pneumoniae IgG


Antibody 


  


  


  Pending  


 


 


Mycoplasma pneumoniae IgM Ab


Titer 


  


  


  Pending  


 


 


Aspergillus flavus Antibody    Pending  


 


Aspergillus fumigatus Antibody    Pending  


 


Aspergillus niger Antibody    Pending  


 


TB Test (T-Spot)    Pending  


 


TB Test Nil Control (T-Spot)    Pending  


 


TB Test Panel A (T-Spot)    Pending  


 


TB Test Panel B (T-Spot)    Pending  


 


TB Test Positive Control


(T-Spot) 


  


  


  Pending  


 


 


Test


  5/22/18


11:50 


  


  


 


 


Histoplasma Antigen Pending     


 


Urine Legionella Antigen Pending     








Height (Feet):  5


Height (Inches):  8.00


Weight (Pounds):  182


Medications





Current Medications








 Medications


  (Trade)  Dose


 Ordered  Sig/Yasmani


 Route


 PRN Reason  Start Time


 Stop Time Status Last Admin


Dose Admin


 


 Acetaminophen


  (Tylenol)  650 mg  Q6H  PRN


 ORAL


 Mild Pain/Temp > 100.5  5/22/18 12:45


 6/21/18 12:44   


 


 


 Acetaminophen/


 Hydrocodone Bitart


  (Norco 10/325)  1 tab  Q4H  PRN


 ORAL


 Moderate Pain (Pain Scale 4-6)  5/22/18 12:45


 5/29/18 12:44   


 


 


 Albuterol/


 Ipratropium


  (Albuterol/


 Ipratropium)  3 ml  Q4H  PRN


 HHN


 Shortness of Breath  5/22/18 08:30


 5/27/18 08:29   


 


 


 Guaifenesin


  (Mucinex ER)  600 mg  TWICE A  DAY


 ORAL


   5/22/18 09:00


 6/21/18 08:59  5/22/18 09:35


 


 


 Guaifenesin


  (Robitussin)  100 mg  Q4H  PRN


 ORAL


 For Cough  5/22/18 08:30


 6/21/18 08:29   


 


 


 Heparin Sodium


  (Porcine)


  (Heparin 5000


 units/ml)  5,000 units  EVERY 12  HOURS


 SUBQ


   5/22/18 09:00


 6/21/18 08:59  5/22/18 09:36


 


 


 Iopamidol


  (Isovue-370


 150ml)  150 ml  NOW  PRN


 INJ


 Radiology Procedure  5/22/18 01:30


 5/24/18 01:19   


 


 


 Lidocaine HCl


  (Xylocaine 1%


 30ml)  30 ml  NOW  PRN


 INJ


 Radiology Procedure  5/22/18 10:30


 5/22/18 23:59   


 


 


 Lorazepam


  (Ativan)  2 mg  Q6H  PRN


 ORAL


 For Anxiety  5/22/18 12:15


 5/29/18 12:14   


 


 


 Morphine Sulfate


  (Morphine


 Sulfate)  4 mg  Q4H  PRN


 IVP


 Severe Pain (Pain Scale 7-10)  5/22/18 12:45


 5/29/18 12:44   


 


 


 Nicotine


  (Nicoderm)  1 patch  Q24H


 TDERMAL


   5/22/18 12:00


 6/21/18 11:59  5/22/18 12:38


 


 


 Piperacillin Sod/


 Tazobactam Sod


 3.375 gm/Sodium


 Chloride  110 ml @ 


 27.5 mls/hr  EVERY 8  HOURS


 IVPB


   5/22/18 14:00


 5/27/18 13:59  5/22/18 14:21


 











Assessment/Plan


Problem List:  


(1) Tobacco abuse


ICD Codes:  Z72.0 - Tobacco use


SNOMED:  140656350


(2) Community acquired bacterial pneumonia


ICD Codes:  J15.9 - Unspecified bacterial pneumonia; J98.4 - Other disorders of 

lung


SNOMED:  959237490, 68690823


(3) Cavitary pneumonia


ICD Codes:  J18.9 - Pneumonia, unspecified organism; J98.4 - Other disorders of 

lung


SNOMED:  771763987


(4) Sepsis due to pneumonia


ICD Codes:  J18.9 - Pneumonia, unspecified organism; A41.9 - Sepsis, 

unspecified organism


SNOMED:  31776658, 997236083


Status:  stable, progressing


Assessment/Plan


- Admit to inpatient


- Pulmonology and ID following


- IV azithromycin and ceftriaxone 


- AFB x 3, r/o TB


- airborne precautions


- f/u sputum cx


- f/u blood cx


- atypical serologies sent 


- duonebs prn 


- CT-guided biopsy to r/o malignancy given cavitary mass. Risks and benefits 

explained to patient in detail including the risks of infection, bleeding, and 

PTX. Patient agreeable to proceed. 


- Nicotine patch 


- IVF 


- TTE


- lipid panel, A1c


- pain and supportive care 





 


DVT Prophylaxis:   SCD, HSQ


Code Status:            Full


Hospital Classification Declaration: Based on this initial evaluation, and 

depending on the patient's clinical course, I anticipate that this patient will 

require hospitalization for [] days for [] and close respiratory/hemodynamic 

monitoring.





Disposition: Once the patient is stable to leave the hospital, I anticipate the 

patient will likely be discharged to the following environment: home with 





I spent 72 minutes on this patient's case, and 42 minutes were dedicated to 

counseling and/or care coordination. Discussed with patient/family, nursing 

staff, SW/CM, ID, and pulmonologist regarding clinical status, treatment course

, and disposition planning.





Thank you for this consultation, Dr. Abreu. 





Time of note may not reflect time of encounter.











Ana María Herndon NP May 22, 2018 15:09

## 2018-05-23 VITALS — SYSTOLIC BLOOD PRESSURE: 132 MMHG | DIASTOLIC BLOOD PRESSURE: 70 MMHG

## 2018-05-23 VITALS — DIASTOLIC BLOOD PRESSURE: 69 MMHG | SYSTOLIC BLOOD PRESSURE: 109 MMHG

## 2018-05-23 VITALS — SYSTOLIC BLOOD PRESSURE: 111 MMHG | DIASTOLIC BLOOD PRESSURE: 66 MMHG

## 2018-05-23 VITALS — SYSTOLIC BLOOD PRESSURE: 116 MMHG | DIASTOLIC BLOOD PRESSURE: 69 MMHG

## 2018-05-23 VITALS — SYSTOLIC BLOOD PRESSURE: 117 MMHG | DIASTOLIC BLOOD PRESSURE: 67 MMHG

## 2018-05-23 VITALS — DIASTOLIC BLOOD PRESSURE: 80 MMHG | SYSTOLIC BLOOD PRESSURE: 115 MMHG

## 2018-05-23 LAB
ADD MANUAL DIFF: NO
ANION GAP SERPL CALC-SCNC: 8 MMOL/L (ref 5–15)
APTT BLD: 33 SEC (ref 23–33)
BASOPHILS NFR BLD AUTO: 0.5 % (ref 0–2)
BUN SERPL-MCNC: 19 MG/DL (ref 7–18)
CALCIUM SERPL-MCNC: 8.5 MG/DL (ref 8.5–10.1)
CHLORIDE SERPL-SCNC: 105 MMOL/L (ref 98–107)
CO2 SERPL-SCNC: 24 MMOL/L (ref 21–32)
CREAT SERPL-MCNC: 1 MG/DL (ref 0.55–1.3)
EOSINOPHIL NFR BLD AUTO: 1.2 % (ref 0–3)
ERYTHROCYTE [DISTWIDTH] IN BLOOD BY AUTOMATED COUNT: 10.4 % (ref 11.6–14.8)
HCT VFR BLD CALC: 34.7 % (ref 42–52)
HGB BLD-MCNC: 12.1 G/DL (ref 14.2–18)
INR PPP: 1.1 (ref 0.9–1.1)
LYMPHOCYTES NFR BLD AUTO: 23.6 % (ref 20–45)
MCV RBC AUTO: 95 FL (ref 80–99)
MONOCYTES NFR BLD AUTO: 7.7 % (ref 1–10)
NEUTROPHILS NFR BLD AUTO: 67 % (ref 45–75)
PLATELET # BLD: 292 K/UL (ref 150–450)
POTASSIUM SERPL-SCNC: 3.9 MMOL/L (ref 3.5–5.1)
RBC # BLD AUTO: 3.65 M/UL (ref 4.7–6.1)
SODIUM SERPL-SCNC: 137 MMOL/L (ref 136–145)
WBC # BLD AUTO: 12.4 K/UL (ref 4.8–10.8)

## 2018-05-23 RX ADMIN — GUAIFENESIN SCH MG: 600 TABLET, EXTENDED RELEASE ORAL at 18:13

## 2018-05-23 RX ADMIN — DEXTROSE MONOHYDRATE SCH MLS/HR: 50 INJECTION, SOLUTION INTRAVENOUS at 23:17

## 2018-05-23 RX ADMIN — GUAIFENESIN SCH MG: 600 TABLET, EXTENDED RELEASE ORAL at 09:09

## 2018-05-23 RX ADMIN — VANCOMYCIN HCL-SODIUM CHLORIDE IV SOLN 1.25 GM/250ML-0.9% SCH MLS/HR: 1.25-0.9/25 SOLUTION at 21:09

## 2018-05-23 RX ADMIN — VANCOMYCIN HCL-SODIUM CHLORIDE IV SOLN 1.25 GM/250ML-0.9% SCH MLS/HR: 1.25-0.9/25 SOLUTION at 09:15

## 2018-05-23 RX ADMIN — HYDROCODONE BITARTRATE AND ACETAMINOPHEN PRN TAB: 10; 325 TABLET ORAL at 21:09

## 2018-05-23 RX ADMIN — DEXTROSE MONOHYDRATE SCH MLS/HR: 50 INJECTION, SOLUTION INTRAVENOUS at 05:55

## 2018-05-23 RX ADMIN — HEPARIN SODIUM SCH UNITS: 5000 INJECTION INTRAVENOUS; SUBCUTANEOUS at 09:11

## 2018-05-23 RX ADMIN — DEXTROSE MONOHYDRATE SCH MLS/HR: 50 INJECTION, SOLUTION INTRAVENOUS at 00:38

## 2018-05-23 RX ADMIN — AZITHROMYCIN DIHYDRATE SCH MG: 250 TABLET, FILM COATED ORAL at 09:09

## 2018-05-23 RX ADMIN — HEPARIN SODIUM SCH UNITS: 5000 INJECTION INTRAVENOUS; SUBCUTANEOUS at 21:11

## 2018-05-23 RX ADMIN — DEXTROSE MONOHYDRATE SCH MLS/HR: 50 INJECTION, SOLUTION INTRAVENOUS at 14:20

## 2018-05-23 NOTE — INFECTIOUS DISEASES PROG NOTE
Assessment/Plan


Assessment/Plan








A)


   1) pneumonia vs neoplasm, ? cap, ? atypical, ? fungal, ? tuberculosis, ? mrsa

, ? post-obstructive/anaerobic


   2) ? sepsis, leukocytosis, fevers - fevers better today, leukocytosis better


   3) pmh o/w negative 


   4) allergies - nkda, sh - + smoking, fh-nc, mar noted, notes and records 

noted


   5) d/w RN





P)


   1) zosyn and vancomycin, azithromycin for now


   2) check sputum cultures, check serology, f/u lab, f/u chest x-ray


   3) patient refusing biopsy 


   4) d/w Ana María Herndon NP


   5) d/w Dr. Abreu yesterday 


   6) d/w patient and wife 


   7) orders noted and entered





Subjective


Constitutional:  Reports: fever - less today ; Denies: fatigue


HEENT:  Reports: congestion - less


Respiratory:  Reports: shortness of breath - less


Cardiovascular:  Denies: chest pain


Gastrointestinal/Abdominal:  Denies: nausea, vomiting, diarrhea


Genitourinary:  Reports: other - no ruiz ; Denies: dysuria, hematuria


Neurologic:  Denies: headache


Psychiatric:  Denies: depression


Skin:  Denies: rash


Hematologic:  Denies: bleeding


Musculoskeletal:  Denies: pain


Allergies:  


Coded Allergies:  


     No Known Allergies (Unverified , 5/21/18)





Objective


Vital Signs





Last 24 Hour Vital Signs








  Date Time  Temp Pulse Resp B/P (MAP) Pulse Ox O2 Delivery O2 Flow Rate FiO2


 


5/23/18 12:00 97.0 60 21 109/69 95 Room Air  





 97.0       


 


5/23/18 10:20  93 20   Room Air  21


 


5/23/18 08:00 97.3 68 21 132/70 95 Room Air  





 97.3       


 


5/23/18 04:37 99.1       


 


5/23/18 04:00 100.0 88 20 115/80 96   





 100.0       


 


5/23/18 03:38 100.0       


 


5/23/18 00:00 99.5 90 22 116/69 98   





 99.5       


 


5/22/18 20:37  92 18   Room Air  21


 


5/22/18 20:00 99.6 92 20 128/66 98   





 99.6       


 


5/22/18 17:16 100.9       


 


5/22/18 16:00 100.9 85 18 111/71 85 Room Air  





 100.9       








Height (Feet):  5


Height (Inches):  8.00


Weight (Pounds):  182


General Appearance:  no acute distress


HEENT:  normocephalic, atraumatic, anicteric, mucous membranes moist


Respiratory/Chest:  no respiratory distress, no accessory muscle use, crackles/

rales - right > left , rhonchi - bilaterally - right > left


Cardiovascular:  normal rate, regular rhythm, no gallop/murmur, no JVD


Abdomen:  normal bowel sounds, soft, non tender, no organomegaly, non distended


Genitourinary:  other - no ruiz, no cva pain


Extremities:  no cyanosis


Skin:  no rash


Neurologic/Psychiatric:  CNs II-XII grossly normal, alert, oriented x 3, 

responsive


Lymphatic:  no neck adenopathy


Musculoskeletal:  no effusion


Objective





Chest x-ray - 5/22 - 





Impression: Right midlung mass with subtle central cavitation, also described on


subsequent chest CT. Neoplastic versus inflammatory


 


Hypoventilatory exam with bilateral basilar atelectasis


 





CT chest:





Impression: Suboptimal opacification of the pulmonary arteries, distal emboli 

could


be missed. No evidence of large vessel central pulmonary embolus demonstrated


 


4.5 x 3.9 x 4 cm cavitary mass in the periphery of the right lower lobe. This 

may be


either neoplastic or inflammatory.


 


Right hilar lymphadenopathy, likely related to the above. This could likewise be


neoplastic or inflammatory.


 


Bilateral reticular interstitial and groundglass opacities. Nonspecific, 

suspect on


the basis of pulmonary edema, but could also be infectious or noninfectious


inflammatory


 


Trace right pleural effusion


 


Borderline cardiomegaly


 


Very questionable cholelithiasis incidentally noted





Microbiology








 Date/Time


Source Procedure


Growth Status


 


 


 5/22/18 00:35


Blood Blood Culture - Preliminary


NO GROWTH AFTER 24 HOURS Resulted


 


 5/22/18 00:30


Blood Blood Culture - Preliminary


NO GROWTH AFTER 24 HOURS Resulted





 5/22/18 11:50


Sputum Gram Stain


Pending Resulted


 


 5/22/18 11:50


Sputum Sputum Culture - Preliminary


NO GROWTH AFTER 24 HOURS Resulted





 5/22/18 11:50


Sputum AFB Specimen Processing Tissue - Final Resulted





 5/22/18 11:50


Sputum Acid Fast Bacilli Smear - Final Resulted


 


 5/22/18 11:50


Sputum Acid Fast Bacilli Culture


Pending Resulted











Laboratory Tests








Test


  5/22/18


16:20 5/23/18


07:20


 


Troponin I


  0.000 ng/mL


(0.000-0.056) 


 


 


White Blood Count


  


  12.4 K/UL


(4.8-10.8)  H


 


Red Blood Count


  


  3.65 M/UL


(4.70-6.10)  L


 


Hemoglobin


  


  12.1 G/DL


(14.2-18.0)  L


 


Hematocrit


  


  34.7 %


(42.0-52.0)  L


 


Mean Corpuscular Volume  95 FL (80-99)  


 


Mean Corpuscular Hemoglobin


  


  33.1 PG


(27.0-31.0)  H


 


Mean Corpuscular Hemoglobin


Concent 


  34.8 G/DL


(32.0-36.0)


 


Red Cell Distribution Width


  


  10.4 %


(11.6-14.8)  L


 


Platelet Count


  


  292 K/UL


(150-450)


 


Mean Platelet Volume


  


  8.4 FL


(6.5-10.1)


 


Neutrophils (%) (Auto)


  


  67.0 %


(45.0-75.0)


 


Lymphocytes (%) (Auto)


  


  23.6 %


(20.0-45.0)


 


Monocytes (%) (Auto)


  


  7.7 %


(1.0-10.0)


 


Eosinophils (%) (Auto)


  


  1.2 %


(0.0-3.0)


 


Basophils (%) (Auto)


  


  0.5 %


(0.0-2.0)


 


Prothrombin Time


  


  11.0 SEC


(9.30-11.50)


 


Prothromb Time International


Ratio 


  1.1 (0.9-1.1)  


 


 


Activated Partial


Thromboplast Time 


  33 SEC (23-33)


 


 


Sodium Level


  


  137 MMOL/L


(136-145)


 


Potassium Level


  


  3.9 MMOL/L


(3.5-5.1)


 


Chloride Level


  


  105 MMOL/L


()


 


Carbon Dioxide Level


  


  24 MMOL/L


(21-32)


 


Anion Gap


  


  8 mmol/L


(5-15)


 


Blood Urea Nitrogen


  


  19 mg/dL


(7-18)  H


 


Creatinine


  


  1.0 MG/DL


(0.55-1.30)


 


Estimat Glomerular Filtration


Rate 


  > 60 mL/min


(>60)


 


Glucose Level


  


  109 MG/DL


()  H


 


Calcium Level


  


  8.5 MG/DL


(8.5-10.1)


 


Cryptococcus Antigen  Pending  











Current Medications








 Medications


  (Trade)  Dose


 Ordered  Sig/Yasmani


 Route


 PRN Reason  Start Time


 Stop Time Status Last Admin


Dose Admin


 


 Acetaminophen


  (Tylenol)  650 mg  Q6H  PRN


 ORAL


 Mild Pain/Temp > 100.5  5/22/18 12:45


 6/21/18 12:44  5/23/18 03:38


 


 


 Acetaminophen/


 Hydrocodone Bitart


  (Norco 10/325)  1 tab  Q4H  PRN


 ORAL


 Moderate Pain (Pain Scale 4-6)  5/22/18 12:45


 5/29/18 12:44  5/22/18 21:08


 


 


 Albuterol/


 Ipratropium


  (Albuterol/


 Ipratropium)  3 ml  Q4H  PRN


 HHN


 Shortness of Breath  5/22/18 08:30


 5/27/18 08:29   


 


 


 Azithromycin


  (Zithromax)  250 mg  DAILY


 ORAL


   5/23/18 09:00


 5/30/18 23:59  5/23/18 09:09


 


 


 Guaifenesin


  (Mucinex ER)  600 mg  TWICE A  DAY


 ORAL


   5/22/18 09:00


 6/21/18 08:59  5/23/18 09:09


 


 


 Guaifenesin


  (Robitussin)  100 mg  Q4H  PRN


 ORAL


 For Cough  5/22/18 08:30


 6/21/18 08:29   


 


 


 Heparin Sodium


  (Porcine)


  (Heparin 5000


 units/ml)  5,000 units  EVERY 12  HOURS


 SUBQ


   5/22/18 09:00


 6/21/18 08:59  5/23/18 09:11


 


 


 Iopamidol


  (Isovue-370


 150ml)  150 ml  NOW  PRN


 INJ


 Radiology Procedure  5/22/18 01:30


 5/24/18 01:19   


 


 


 Lorazepam


  (Ativan)  2 mg  Q6H  PRN


 ORAL


 For Anxiety  5/22/18 12:15


 5/29/18 12:14   


 


 


 Morphine Sulfate


  (Morphine


 Sulfate)  4 mg  Q4H  PRN


 IVP


 Severe Pain (Pain Scale 7-10)  5/22/18 12:45


 5/29/18 12:44   


 


 


 Nicotine


  (Nicoderm)  1 patch  Q24H


 TDERMAL


   5/22/18 12:00


 6/21/18 11:59  5/23/18 12:27


 


 


 Piperacillin Sod/


 Tazobactam Sod


 3.375 gm/Sodium


 Chloride  110 ml @ 


 27.5 mls/hr  EVERY 8  HOURS


 IVPB


   5/22/18 14:00


 5/27/18 13:59  5/23/18 05:55


 


 


 Vancomycin HCl


  (Vanco rx to


 dose)  1 ea  DAILY  PRN


 MISC


 Per rx protocol  5/22/18 20:15


 6/21/18 20:14   


 


 


 Vancomycin HCl/


 Dextrose  250 ml @ 


 166.667


 mls/hr  Q12H


 IVPB


   5/23/18 09:00


 5/28/18 23:59  5/23/18 09:15


 

















QIAN VILLA May 23, 2018 14:30

## 2018-05-23 NOTE — CONSULTATION
DATE OF CONSULTATION:  05/22/2018



INFECTIOUS DISEASE CONSULTATION



CONSULTING PHYSICIAN:  Maggie Gabriel M.D.



ATTENDING PHYSICIAN:  Julian Abreu M.D.



REFERRING PHYSICIANS:

1. Julian Abreu M.D.

2. Sarah Rivas M.D.

3. Ana María Herndon, nurse practitioner.



REASON FOR CONSULTATION:  Possible pneumonia, leukocytosis, fevers,

questionable sepsis.



CHIEF COMPLAINT:  The patient's chief complaint coming in the hospital is

pneumonia.



HISTORY OF PRESENT ILLNESS:  This is a 53-year-old male, who has history of

smoking, who over the last 2 weeks has been coughing.  He has had cough,

congestion, and has whitish-yellow sputum production.  The patient was

given in the outpatient setting what looks like antibiotics including

Levaquin.  The patient was admitted to [facility] because of fevers,

elevated white count, pneumonia seen on chest x-ray, and CT scan which

showed cavitary lesions specifically in the right lower periphery with a

right lower lobe cavitary mass.  Infectious Disease consultation was

requested for antibiotic management.  The patient was placed on Zosyn,

vancomycin, and azithromycin.  Case was discussed with Dr. Abreu about

antibiotics in this patient.  Serology and cultures have been sent and I

believe a biopsy is planned for this patient.  The patient was also placed

on isolation because of the possibility of tuberculosis and the patient

has a cavitary lesion.  MAR was noted.  Orders were noted.  Notes were

reviewed.  Case was discussed with the patient and the RN.



PAST MEDICAL HISTORY:  The patient has history of the following.  The

patient has past medical history of nicotine dependency.  He denies any

history of diabetes or hypertension.  Past medical history otherwise is

negative.



MEDICATIONS:  Upon reviewing the MAR, he is on the following medications.

He is on Zosyn, acetaminophen.  He started vancomycin.  He is on

azithromycin, morphine, lorazepam, nicotine, lidocaine, cough syrup,

heparin, and albuterol treatments.  Outside medications noted and

reconciliated.  He was given Levaquin as an outpatient, Zyrtec, and

Phenergan.



ALLERGIES:  No known drug allergies.  No antibiotic allergies.



SOCIAL HISTORY:  Positive for smoking.  No alcohol or drug abuse.



FAMILY HISTORY:  Noncontributory.  Negative for exposure to tuberculosis or

cancer.



TRAVEL HISTORY:  He was in [place] he states 3 months ago, otherwise no other

_____ risk factors such as tick bites or mosquito bites.



REVIEW OF SYSTEMS:  CONSTITUTIONAL:  The patient denies any night sweats or

weight loss.  He does have fevers and chills.  He has generalized fatigue,

but no new focal weakness.  HEAD AND NECK:  No thrush, dysphagia, sinus

tenderness, neck stiffness, or change in vision.   CARDIAC:  No chest pain

or palpitations.  GASTROINTESTINAL:  No nausea, vomiting, or diarrhea.

GENITOURINARY:  No dysuria or frequency.  No CVA tenderness.  PULMONARY:

He stated he has cough for around close to two weeks including whitish and

yellow sputum production and phlegm.  No hemoptysis mentioned.  SKIN:  No

rash or itching.  EXTREMITIES:  No extremity pain.  NEUROLOGIC:  No

seizures.



PHYSICAL EXAMINATION:

GENERAL:  Alert and responsive.  He looks weak, but is responsive and

oriented x3.

VITAL SIGNS:  The patient has been consistently febrile, T-max 101.2

degrees and currently temperature 100.4 degrees, pulse rate 85,

respiratory rate 18, blood pressure 111/71, and saturation 85% on room air

to 98%.  Pulse rate has been as high as 93.  Respiratory rate has been as

high as 33.

HEAD AND NECK:  Oral exam, no thrush.  Eye exam, no icterus.  No sinus

tenderness.  Normocephalic.  No facial droop.  No neck stiffness.  Neck is

supple.

HEART:  Regular.  No gallop or murmur.

ABDOMEN:  Soft.  Positive bowel sounds.  Nontender.

LUNGS:  Few bilateral rhonchi and possible rales on the right.

SKIN:  No rash.

MUSCULOSKELETAL:  No effusion.  Legs without cellulitis.

PERIPHERAL VASCULAR:  No cyanosis or gangrene.

NEUROLOGIC:  Intact.

LINES:  Line sites without phlebitis.

GENITOURINARY:  No Vick.  No CVA tenderness.



LABORATORY AND DIAGNOSTIC DATA:  Laboratory data as follows.  White count

14.6 and hemoglobin 13.9.  The patient's creatinine is 1.0.  LFTs were

noted.  Chest x-ray shows right mid lung mass with subtle central

cavitation.  CT scan of the chest showed a cavitating mass in the

periphery of the right lower lobe.  It showed right hilar adenopathy.  It

showed reticular, interstitial, and ground-glass opacities that are

nonspecific for edema versus inflammatory process versus infection.

Report was noted.  Serology has been ordered and cultures are pending.  UA

was negative.



ASSESSMENT AND PLAN:

1. The patient has possible pneumonia, rule out neoplastic process.

Differential is community-acquired pneumonia, question of atypical such as 
fungal

pneumonia, rule out tuberculosis with cavitary lesions.  Rule out MRSA

pneumonia with cavitary lesions.  Rule out neoplasm, rule out

postobstructive pneumonia, rule out possible postobstructive anaerobic

pneumonia.  At this time, we will place the patient on vancomycin and

Zosyn for MRSA, gram-negative, and anaerobic coverage.  Also cover

Streptococcus pneumonia.  We will check sputum culture, blood cultures,

laboratories, and chest x-ray.  We will also check serology for fungal,

cryptococcus, histoplasmosis, coccidioidomycosis, Legionella, Mycoplasma.

Check TB Gold test.  AFB has been ordered.  Continue vancomycin, Zosyn,

and azithromycin for now pending workup.  Watch the patient's temperatures

and white count.  Of note, the patient also could have early sepsis with

SIRS criteria including leukocytosis, fevers, heart rate over 90, and

elevated respiratory rate.  The patient's saturations are on low side,

watch closely.  The patient may need transfer to higher level of care.

Antibiotics were discussed with Dr. Abreu.  The patient also is to

undergo biopsy and discussed with Dr. Abreu.  Followup labs and chest

x-ray have also been ordered.

2. The patient has no other significant past medical history.

3. Watch creatinine and antibiotics.

4. Elevated sedimentation rate was noted.

5. Mild anemia.

6. Nicotine dependency.

7. Social history, positive for smoking.

8. Family history noncontributory.

9. MAR was noted.

10. Case discussed with RN.

11. No known allergies.

12. Continue treatment per primary consultants.

13. Notes were noted.

14. Orders were entered.

15. Case discussed with the patient.









  ______________________________________________

  Maggie Gabriel M.D.





DR:  AUGUSTINE

D:  05/22/2018 20:23

T:  05/23/2018 01:14

JOB#:  [id number]

CC:



MARIAN

## 2018-05-23 NOTE — GENERAL PROGRESS NOTE
Assessment/Plan


Problem List:  


(1) Tobacco abuse


ICD Codes:  Z72.0 - Tobacco use


SNOMED:  429689281


(2) Cavitary pneumonia


ICD Codes:  J18.9 - Pneumonia, unspecified organism; J98.4 - Other disorders of 

lung


SNOMED:  191609093


(3) Sepsis due to pneumonia


ICD Codes:  J18.9 - Pneumonia, unspecified organism; A41.9 - Sepsis, 

unspecified organism


SNOMED:  86782572, 271814989


Status:  stable, progressing


Assessment/Plan


- Pulmonology and ID following, appreciate rec's 


- IV zosyn, vancomycin, and azithromycin (D2) 


- AFB x 3, r/o TB


- airborne precautions


- f/u sputum cx


- f/u blood cx


- atypical serologies sent: legionella, cryptococcus, coccidio, histoplasma, 

mycoplasma, M. pneumoniae, Aspergillus


- f/u quantiferon gold 


- duonebs prn 


- Nicotine patch 


- IVF 


- TTE 60-65%


- trops negative. EKG NSR. ACS ruled out. Right-sided chest pain likely 2/2 

pneumonia 


- lipid panel, A1c


- pain and supportive care 


- Patient refused CT-guided bx to evaluate the mass. Patient prefers to have 

this done after the infection resolves. Patient agrees to have a PET/CT in 4 

weeks with close f/u with Dr. Abreu in 1-2 weeks after hospital discharge 





 


DVT Prophylaxis:   SCD, HSQ


Code Status:            Full


Hospital Classification Declaration: Based on this initial evaluation, and 

depending on the patient's clinical course, I anticipate that this patient will 

require hospitalization for 4-5 days for sepsis, atypical pneumonia and close 

respiratory/hemodynamic monitoring.





Disposition: Once the patient is stable to leave the hospital, I anticipate the 

patient will likely be discharged to the following environment: home with 





I spent 42 minutes on this patient's case, and 22 minutes were dedicated to 

counseling and/or care coordination. Discussed with patient/family, nursing 

staff, SW/CM, ID, and pulmonologist regarding clinical status, treatment course

, and disposition planning.





Thank you, Dr. Abreu, for this consultation.





Time of note may not reflect time of encounter.





Subjective


Date patient seen:  May 23, 2018


Time patient seen:  11:23


Allergies:  


Coded Allergies:  


     No Known Allergies (Unverified , 5/21/18)


Subjective


- no acute events overnight


- TTE showing 60-65% EF


- WBC 14-->12. Tmax 100.0


- refusing CT guided bx. states that he would like to follow the results of the 

serologies first prior to having the procedure done 


- still c/o right-sided chest pain but has improved. reports mild sob 


- wife at bedside





Objective





Last 24 Hour Vital Signs








  Date Time  Temp Pulse Resp B/P (MAP) Pulse Ox O2 Delivery O2 Flow Rate FiO2


 


5/23/18 12:00 97.0 60 21 109/69 95 Room Air  





 97.0       


 


5/23/18 10:20  93 20   Room Air  21


 


5/23/18 08:00 97.3 68 21 132/70 95 Room Air  





 97.3       


 


5/23/18 04:37 99.1       


 


5/23/18 04:00 100.0 88 20 115/80 96   





 100.0       


 


5/23/18 03:38 100.0       


 


5/23/18 00:00 99.5 90 22 116/69 98   





 99.5       


 


5/22/18 20:37  92 18   Room Air  21


 


5/22/18 20:00 99.6 92 20 128/66 98   





 99.6       


 


5/22/18 17:16 100.9       


 


5/22/18 16:00 100.9 85 18 111/71 85 Room Air  





 100.9       

















Intake and Output  


 


 5/22/18 5/23/18





 19:00 07:00


 


Intake Total 500 ml 380.0 ml


 


Output Total 800 ml 500 ml


 


Balance -300 ml -120.0 ml


 


  


 


Intake Oral 500 ml 20 ml


 


IV Total  360.0 ml


 


Output Urine Total 800 ml 500 ml


 


Stool Total 0 ml 








Laboratory Tests


5/22/18 16:20: Troponin I 0.000


5/23/18 07:20: 


White Blood Count 12.4H, Red Blood Count 3.65L, Hemoglobin 12.1L, Hematocrit 

34.7L, Mean Corpuscular Volume 95, Mean Corpuscular Hemoglobin 33.1H, Mean 

Corpuscular Hemoglobin Concent 34.8, Red Cell Distribution Width 10.4L, 

Platelet Count 292, Mean Platelet Volume 8.4, Neutrophils (%) (Auto) 67.0, 

Lymphocytes (%) (Auto) 23.6, Monocytes (%) (Auto) 7.7, Eosinophils (%) (Auto) 

1.2, Basophils (%) (Auto) 0.5, Prothrombin Time 11.0, Prothromb Time 

International Ratio 1.1, Activated Partial Thromboplast Time 33, Sodium Level 

137, Potassium Level 3.9, Chloride Level 105, Carbon Dioxide Level 24, Anion 

Gap 8, Blood Urea Nitrogen 19H, Creatinine 1.0, Estimat Glomerular Filtration 

Rate > 60, Glucose Level 109H, Calcium Level 8.5, Cryptococcus Antigen [Pending]


Height (Feet):  5


Height (Inches):  8.00


Weight (Pounds):  182


General Appearance:  alert, mild distress


EENT:  PERRL/EOMI, normal ENT inspection


Neck:  non-tender, normal alignment, supple


Cardiovascular:  normal peripheral pulses, normal rate, regular rhythm


Respiratory/Chest:  chest wall non-tender, lungs clear, normal breath sounds


Abdomen:  normal bowel sounds, non tender, soft


Neurologic:  CNs II-XII grossly normal, no motor/sensory deficits, alert, 

oriented x 3


Skin:  normal pigmentation, warm/dry











Ana María Herndon NP May 23, 2018 14:43

## 2018-05-23 NOTE — PULMONOLOGY PROGRESS NOTE
Assessment/Plan


Problems:  


(1) Community acquired bacterial pneumonia


(2) Cavitary pneumonia


(3) Sepsis due to pneumonia


(4) Tobacco abuse


Assessment/Plan


ASSESSMENT: 


* 4 cm RLL cavitary mass with R hilar LAD, CAP vs neoplasm


* Extensive daily smoker


* B GGO's and CM


* Periorbital Xanthomas





PLAN: 


* R/O TB with AFB x 3


* F/U sputum Cx


* F/U Atypical infectious and inflammatory serologies  


* Zosyn, Azithro & Vanco (D2) per ID


* PRN HHN's


* Cancel CTGBx ----> Discussed with patient, will complete course of Abx then 

will get PET/CT in 4 weeks @ [facility]


* Optimize pulmonary hygiene/mobilize as tolerated


* Nicotine patch


* Discuss tobacco cessation


* Monitor volumes


* Outpatient PFT's


* DVT Px: Hep SQ


* FC


* Can F/U with me in 1-2 weeks after D/C or earlier PRN





Subjective


Allergies:  


Coded Allergies:  


     No Known Allergies (Unverified , 5/21/18)


Subjective


AFVSS, stable on RA


+ cough, productive of yellow phlegm


No F, + chills at nights, no N/VD/C


WCt better, CX's, AFB and serologies pending





Objective





Last 24 Hour Vital Signs








  Date Time  Temp Pulse Resp B/P (MAP) Pulse Ox O2 Delivery O2 Flow Rate FiO2


 


5/23/18 08:00 97.3 68 21 132/70 95 Room Air  





 97.3       


 


5/23/18 04:37 99.1       


 


5/23/18 04:00 100.0 88 20 115/80 96   





 100.0       


 


5/23/18 03:38 100.0       


 


5/23/18 00:00 99.5 90 22 116/69 98   





 99.5       


 


5/22/18 20:37  92 18   Room Air  21


 


5/22/18 20:00 99.6 92 20 128/66 98   





 99.6       


 


5/22/18 17:16 100.9       


 


5/22/18 16:00 100.9 85 18 111/71 85 Room Air  





 100.9       


 


5/22/18 12:12  82 16   Room Air  21


 


5/22/18 12:00 98.2 80 16 110/74 98 Room Air  





 98.2       

















Intake and Output  


 


 5/22/18 5/23/18





 19:00 07:00


 


Intake Total 500 ml 380.0 ml


 


Output Total 800 ml 500 ml


 


Balance -300 ml -120.0 ml


 


  


 


Intake Oral 500 ml 20 ml


 


IV Total  360.0 ml


 


Output Urine Total 800 ml 500 ml


 


Stool Total 0 ml 








General Appearance:  WD/WN, no acute distress


HEENT:  normocephalic, atraumatic, anicteric, mucous membranes moist


Respiratory/Chest:  chest wall non-tender, lungs clear, rhonchi - scattered


Cardiovascular:  normal peripheral pulses, normal rate, regular rhythm, 

regularly irregular


Abdomen:  normal bowel sounds, soft, non tender, no organomegaly, non distended


Extremities:  no cyanosis, no clubbing, no edema





Microbiology








 Date/Time


Source Procedure


Growth Status


 


 


 5/22/18 00:35


Blood Blood Culture - Preliminary


NO GROWTH AFTER 24 HOURS Resulted


 


 5/22/18 00:30


Blood Blood Culture - Preliminary


NO GROWTH AFTER 24 HOURS Resulted





 5/22/18 11:50


Sputum Gram Stain


Pending Resulted


 


 5/22/18 11:50


Sputum Sputum Culture - Preliminary


NO GROWTH AFTER 24 HOURS Resulted








Laboratory Tests


5/22/18 11:00: 


Hemoglobin A1c 4.9, C-Reactive Protein, Quantitative 7.0H, Triglycerides Level 

91, Cholesterol Level 130, LDL Cholesterol 90, HDL Cholesterol 24L, Cholesterol/

HDL Ratio 5.4H, Anti-Nuclear Antibody Screen [Pending], c-ANCA Titer [Pending], 

p-ANCA Titer [Pending], Coccidioides Antibody (Comp Fix) [Pending], Mycoplasma 

pneumoniae IgG Antibody [Pending], Mycoplasma pneumoniae IgM Ab Titer [Pending]

, Aspergillus flavus Antibody [Pending], Aspergillus fumigatus Antibody [Pending

], Aspergillus niger Antibody [Pending], TB Test (T-Spot) [Pending], TB Test 

Nil Control (T-Spot) [Pending], TB Test Panel A (T-Spot) [Pending], TB Test 

Panel B (T-Spot) [Pending], TB Test Positive Control (T-Spot) [Pending]


5/22/18 11:50: 


Histoplasma Antigen [Pending], Urine Legionella Antigen [Pending]


5/22/18 11:51: M. tuberculosis Complex DNA (PCR) [Pending]


5/22/18 16:20: Troponin I 0.000


5/23/18 07:20: 


White Blood Count 12.4H, Red Blood Count 3.65L, Hemoglobin 12.1L, Hematocrit 

34.7L, Mean Corpuscular Volume 95, Mean Corpuscular Hemoglobin 33.1H, Mean 

Corpuscular Hemoglobin Concent 34.8, Red Cell Distribution Width 10.4L, 

Platelet Count 292, Mean Platelet Volume 8.4, Neutrophils (%) (Auto) 67.0, 

Lymphocytes (%) (Auto) 23.6, Monocytes (%) (Auto) 7.7, Eosinophils (%) (Auto) 

1.2, Basophils (%) (Auto) 0.5, Prothrombin Time 11.0, Prothromb Time 

International Ratio 1.1, Activated Partial Thromboplast Time 33, Sodium Level 

137, Potassium Level 3.9, Chloride Level 105, Carbon Dioxide Level 24, Anion 

Gap 8, Blood Urea Nitrogen 19H, Creatinine 1.0, Estimat Glomerular Filtration 

Rate > 60, Glucose Level 109H, Calcium Level 8.5, Cryptococcus Antigen [Pending]





Current Medications








 Medications


  (Trade)  Dose


 Ordered  Sig/Yasmani


 Route


 PRN Reason  Start Time


 Stop Time Status Last Admin


Dose Admin


 


 Acetaminophen


  (Tylenol)  650 mg  Q6H  PRN


 ORAL


 Mild Pain/Temp > 100.5  5/22/18 12:45


 6/21/18 12:44  5/23/18 03:38


 


 


 Acetaminophen/


 Hydrocodone Bitart


  (Norco 10/325)  1 tab  Q4H  PRN


 ORAL


 Moderate Pain (Pain Scale 4-6)  5/22/18 12:45


 5/29/18 12:44  5/22/18 21:08


 


 


 Albuterol/


 Ipratropium


  (Albuterol/


 Ipratropium)  3 ml  Q4H  PRN


 HHN


 Shortness of Breath  5/22/18 08:30


 5/27/18 08:29   


 


 


 Azithromycin


  (Zithromax)  250 mg  DAILY


 ORAL


   5/23/18 09:00


 5/30/18 23:59  5/23/18 09:09


 


 


 Guaifenesin


  (Mucinex ER)  600 mg  TWICE A  DAY


 ORAL


   5/22/18 09:00


 6/21/18 08:59  5/23/18 09:09


 


 


 Guaifenesin


  (Robitussin)  100 mg  Q4H  PRN


 ORAL


 For Cough  5/22/18 08:30


 6/21/18 08:29   


 


 


 Heparin Sodium


  (Porcine)


  (Heparin 5000


 units/ml)  5,000 units  EVERY 12  HOURS


 SUBQ


   5/22/18 09:00


 6/21/18 08:59  5/23/18 09:11


 


 


 Iopamidol


  (Isovue-370


 150ml)  150 ml  NOW  PRN


 INJ


 Radiology Procedure  5/22/18 01:30


 5/24/18 01:19   


 


 


 Lorazepam


  (Ativan)  2 mg  Q6H  PRN


 ORAL


 For Anxiety  5/22/18 12:15


 5/29/18 12:14   


 


 


 Morphine Sulfate


  (Morphine


 Sulfate)  4 mg  Q4H  PRN


 IVP


 Severe Pain (Pain Scale 7-10)  5/22/18 12:45


 5/29/18 12:44   


 


 


 Nicotine


  (Nicoderm)  1 patch  Q24H


 TDERMAL


   5/22/18 12:00


 6/21/18 11:59  5/22/18 12:38


 


 


 Piperacillin Sod/


 Tazobactam Sod


 3.375 gm/Sodium


 Chloride  110 ml @ 


 27.5 mls/hr  EVERY 8  HOURS


 IVPB


   5/22/18 14:00


 5/27/18 13:59  5/23/18 05:55


 


 


 Vancomycin HCl


  (Vanco rx to


 dose)  1 ea  DAILY  PRN


 MISC


 Per rx protocol  5/22/18 20:15


 6/21/18 20:14   


 


 


 Vancomycin HCl/


 Dextrose  250 ml @ 


 166.667


 mls/hr  Q12H


 IVPB


   5/23/18 09:00


 5/28/18 23:59  5/23/18 09:15


 

















IVY MADSEN M.D. May 23, 2018 09:51

## 2018-05-23 NOTE — CARDIOLOGY REPORT
--------------- APPROVED REPORT --------------





EKG Measurement

Heart [id number]

N[place] [id number]

[id number]

[id number]

FRq125





Sinus rhythm with fusion complexes

Possible Left atrial enlargement

Borderline ECG

## 2018-05-24 VITALS — SYSTOLIC BLOOD PRESSURE: 117 MMHG | DIASTOLIC BLOOD PRESSURE: 69 MMHG

## 2018-05-24 VITALS — DIASTOLIC BLOOD PRESSURE: 66 MMHG | SYSTOLIC BLOOD PRESSURE: 99 MMHG

## 2018-05-24 VITALS — SYSTOLIC BLOOD PRESSURE: 98 MMHG | DIASTOLIC BLOOD PRESSURE: 63 MMHG

## 2018-05-24 VITALS — DIASTOLIC BLOOD PRESSURE: 69 MMHG | SYSTOLIC BLOOD PRESSURE: 114 MMHG

## 2018-05-24 VITALS — DIASTOLIC BLOOD PRESSURE: 73 MMHG | SYSTOLIC BLOOD PRESSURE: 113 MMHG

## 2018-05-24 VITALS — DIASTOLIC BLOOD PRESSURE: 63 MMHG | SYSTOLIC BLOOD PRESSURE: 96 MMHG

## 2018-05-24 RX ADMIN — DEXTROSE MONOHYDRATE SCH MLS/HR: 50 INJECTION, SOLUTION INTRAVENOUS at 05:51

## 2018-05-24 RX ADMIN — HEPARIN SODIUM SCH UNITS: 5000 INJECTION INTRAVENOUS; SUBCUTANEOUS at 20:55

## 2018-05-24 RX ADMIN — GUAIFENESIN SCH MG: 600 TABLET, EXTENDED RELEASE ORAL at 17:35

## 2018-05-24 RX ADMIN — GUAIFENESIN SCH MG: 600 TABLET, EXTENDED RELEASE ORAL at 08:25

## 2018-05-24 RX ADMIN — AZITHROMYCIN DIHYDRATE SCH MG: 250 TABLET, FILM COATED ORAL at 08:25

## 2018-05-24 RX ADMIN — DEXTROSE MONOHYDRATE SCH MLS/HR: 50 INJECTION, SOLUTION INTRAVENOUS at 20:54

## 2018-05-24 RX ADMIN — VANCOMYCIN HCL-SODIUM CHLORIDE IV SOLN 1.25 GM/250ML-0.9% SCH MLS/HR: 1.25-0.9/25 SOLUTION at 10:38

## 2018-05-24 RX ADMIN — HEPARIN SODIUM SCH UNITS: 5000 INJECTION INTRAVENOUS; SUBCUTANEOUS at 08:29

## 2018-05-24 RX ADMIN — DEXTROSE MONOHYDRATE SCH MLS/HR: 50 INJECTION, SOLUTION INTRAVENOUS at 13:51

## 2018-05-24 RX ADMIN — HYDROCODONE BITARTRATE AND ACETAMINOPHEN PRN TAB: 10; 325 TABLET ORAL at 08:25

## 2018-05-24 NOTE — GENERAL PROGRESS NOTE
Assessment/Plan


Problem List:  


(1) Tobacco abuse


ICD Codes:  Z72.0 - Tobacco use


SNOMED:  099843208


(2) Cavitary pneumonia


ICD Codes:  J18.9 - Pneumonia, unspecified organism; J98.4 - Other disorders of 

lung


SNOMED:  706132087


(3) Sepsis due to pneumonia


ICD Codes:  J18.9 - Pneumonia, unspecified organism; A41.9 - Sepsis, 

unspecified organism


SNOMED:  42623335, [id number]


Status:  stable, progressing


Assessment/Plan


- Pulmonology and ID following, appreciate rec's 


- repeat CXR on 5/24 shows cavitary mass on right lung region, no change


- s/p IV vancomycin (5/22-5/24). Continue IV zosyn and azithromycin (5/22 - )


- sputum cx no growth at 48 hours. will dc IV vancomycin per discussion with ID 


- AFB x 3, r/o TB. AFB #1 negative. 


- airborne precautions


- f/u blood cx - NGTD


- atypical serologies sent: legionella, cryptococcus, coccidio, histoplasma, 

mycoplasma, M. pneumoniae, Aspergillus


- f/u quantiferon gold 


- duonebs prn 


- Nicotine patch 


- IVF 


- TTE 60-65%


- trops negative. EKG NSR. ACS ruled out. Right-sided chest pain likely 2/2 

pneumonia 


- lipid panel, A1c


- pain and supportive care 


- Patient refused CT-guided bx to evaluate the mass. Patient prefers to have 

this done after the infection resolves. Patient agrees to have a PET/CT in 4 

weeks with close f/u with Dr. Abreu in 1-2 weeks after hospital discharge 





 


DVT Prophylaxis:   SCD, HSQ


Code Status:            Full


Hospital Classification Declaration: Based on this initial evaluation, and 

depending on the patient's clinical course, I anticipate that this patient will 

require hospitalization for 4-5 days for sepsis, atypical pneumonia and close 

respiratory/hemodynamic monitoring.





Disposition: Once the patient is stable to leave the hospital, I anticipate the 

patient will likely be discharged to the following environment: home with 





I spent 41 minutes on this patient's case, and 23 minutes were dedicated to 

counseling and/or care coordination. Discussed with patient/family, nursing 

staff, SW/CM, ID, and pulmonologist regarding clinical status, treatment course

, and disposition planning.





Thank you, Dr. Abreu, for this consultation.





Time of note may not reflect time of encounter.





Subjective


Date patient seen:  May 24, 2018


Time patient seen:  14:28


Allergies:  


Coded Allergies:  


     No Known Allergies (Unverified , 5/21/18)


Subjective


- no acute events overnight


- denies sob or cp 


- reports nausea after breakfast today. states the eggs were bad. no abdominal 

pain, d/c


- repeat CXR today showed cavitary mass in the right lung region. no change


- AM labs pending





Objective





Last 24 Hour Vital Signs








  Date Time  Temp Pulse Resp B/P (MAP) Pulse Ox O2 Delivery O2 Flow Rate FiO2


 


5/24/18 12:00 97.3 80 20 117/69 100 Room Air  





 97.3       


 


5/24/18 10:12  78 16   Room Air  21


 


5/24/18 08:00 97.3 83 20 99/66 97 Room Air  





 97.3       


 


5/24/18 04:00 98.8 73 20 113/73 97   





 98.8       


 


5/24/18 00:00 98.3 72 20 98/63 96   





 98.3       


 


5/23/18 20:00 100.3 86 20 111/66 96   





 100.3       


 


5/23/18 19:56  88 20   Room Air  21


 


5/23/18 16:10 97.1 83 20 117/67 97   





 97.1       

















Intake and Output  


 


 5/23/18 5/24/18





 19:00 07:00


 


Intake Total 517.500 ml 793.334 ml


 


Output Total  350 ml


 


Balance 517.500 ml 443.334 ml


 


  


 


Intake Oral 240 ml 350 ml


 


IV Total 277.500 ml 443.334 ml


 


Output Urine Total  350 ml


 


# Voids  7


 


# Bowel Movements  1








Laboratory Tests


5/24/18 08:00: Vancomycin Level Trough 12.6H


Height (Feet):  5


Height (Inches):  8.00


Weight (Pounds):  182


General Appearance:  no apparent distress, alert


EENT:  PERRL/EOMI, normal ENT inspection


Neck:  non-tender, normal alignment, supple


Cardiovascular:  normal peripheral pulses, normal rate, regular rhythm


Respiratory/Chest:  chest wall non-tender, lungs clear, normal breath sounds


Abdomen:  normal bowel sounds, non tender, soft


Extremities:  normal range of motion, non-tender


Neurologic:  CNs II-XII grossly normal, no motor/sensory deficits, alert, 

oriented x 3


Skin:  normal pigmentation, warm/dry











Yanick,Ana María NP May 24, 2018 14:31

## 2018-05-24 NOTE — DIAGNOSTIC IMAGING REPORT
Indication: Dyspnea

 

Comparison:  5/22/2018

 

A single view chest radiograph was obtained.

 

Findings:

 

Cavitary lesion again demonstrated within the right midlung. Heart size is normal. No

pleural effusion identified. Bones are unremarkable.

 

IMPRESSION:

 

Cavitary mass in the right midlung. No change

## 2018-05-24 NOTE — INFECTIOUS DISEASES PROG NOTE
Assessment/Plan


Assessment/Plan








A)


   1) pneumonia, ? neoplasm, ? cap, ? atypical, ? fungal, ? tuberculosis,? post-

obstructive/anaerobic





      - clinically better, no sob, less fevers


      - leukocytosis and fevers better 


      - tb-spot negative 


      - awaiting decision for biopsy


      


   2) ? sepsis, leukocytosis, fevers - fevers and leukocytosis better


   3) pmh o/w negative 


   4) allergies - nkda, sh - + smoking, fh-nc, mar noted, notes and records 

noted


   5) d/w RN





P)


   1) zosyn and azithromycin, discontinue vancomycin since no mrsa growing out


   2) check sputum cultures final, check serology, f/u lab, f/u chest x-ray, 

check afb/cultures, await tb pcr 


   3) patient refusing biopsy for now, awaiting final decision 


   4) d/w Ana María Herndon NP


   5) d/w patient and daughter 


   6) orders noted and entered





Subjective


Constitutional:  Reports: fatigue, other - lgt only ; Denies: fever


HEENT:  Denies: congestion


Respiratory:  Denies: shortness of breath


Cardiovascular:  Denies: chest pain


Gastrointestinal/Abdominal:  Denies: nausea, vomiting, diarrhea


Genitourinary:  Denies: dysuria, hematuria


Neurologic:  Denies: headache


Psychiatric:  Denies: no symptoms, depression


Skin:  Denies: rash


Hematologic:  Denies: bleeding


Musculoskeletal:  Denies: pain


Allergies:  


Coded Allergies:  


     No Known Allergies (Unverified , 5/21/18)





Objective


Vital Signs





Last 24 Hour Vital Signs








  Date Time  Temp Pulse Resp B/P (MAP) Pulse Ox O2 Delivery O2 Flow Rate FiO2


 


5/24/18 12:00 97.3 80 20 117/69 100 Room Air  





 97.3       


 


5/24/18 10:12  78 16   Room Air  21


 


5/24/18 08:00 97.3 83 20 99/66 97 Room Air  





 97.3       


 


5/24/18 04:00 98.8 73 20 113/73 97   





 98.8       


 


5/24/18 00:00 98.3 72 20 98/63 96   





 98.3       


 


5/23/18 20:00 100.3 86 20 111/66 96   





 100.3       


 


5/23/18 19:56  88 20   Room Air  21


 


5/23/18 16:10 97.1 83 20 117/67 97   





 97.1       








Height (Feet):  5


Height (Inches):  8.00


Weight (Pounds):  182


General Appearance:  no acute distress


HEENT:  normocephalic, atraumatic, anicteric, mucous membranes moist


Respiratory/Chest:  no respiratory distress, no accessory muscle use, crackles/

rales, rhonchi - bilaterally, other - clearer bilateral


Cardiovascular:  normal rate, regular rhythm, no gallop/murmur, no JVD


Abdomen:  normal bowel sounds, soft, non tender, no organomegaly, non distended


Genitourinary:  other - no ruiz


Extremities:  no cyanosis


Skin:  no rash


Neurologic/Psychiatric:  CNs II-XII grossly normal, alert, oriented x 3, 

responsive


Lymphatic:  no neck adenopathy


Musculoskeletal:  no effusion


Objective





Chest x-ray - 5/22 - 





Impression: Right midlung mass with subtle central cavitation, also described on


subsequent chest CT. Neoplastic versus inflammatory


 


Hypoventilatory exam with bilateral basilar atelectasis


 





CT chest:





Impression: Suboptimal opacification of the pulmonary arteries, distal emboli 

could


be missed. No evidence of large vessel central pulmonary embolus demonstrated


 


4.5 x 3.9 x 4 cm cavitary mass in the periphery of the right lower lobe. This 

may be


either neoplastic or inflammatory.


 


Right hilar lymphadenopathy, likely related to the above. This could likewise be


neoplastic or inflammatory.


 


Bilateral reticular interstitial and groundglass opacities. Nonspecific, 

suspect on


the basis of pulmonary edema, but could also be infectious or noninfectious


inflammatory


 


Trace right pleural effusion


 


Borderline cardiomegaly


 


Very questionable cholelithiasis incidentally noted





Microbiology








 Date/Time


Source Procedure


Growth Status


 


 


 5/22/18 00:35


Blood Blood Culture - Preliminary


NO GROWTH AFTER 48 HOURS Resulted


 


 5/22/18 00:30


Blood Blood Culture - Preliminary


NO GROWTH AFTER 48 HOURS Resulted





 5/22/18 11:50


Sputum Gram Stain - Final Resulted


 


 5/22/18 11:50


Sputum Sputum Culture - Preliminary


NORMAL UPPER RESPIRATORY FRANCIE AT 48 ... Resulted





 5/22/18 11:50


Sputum AFB Specimen Processing Tissue - Final Resulted





 5/22/18 11:50


Sputum Acid Fast Bacilli Smear - Final Resulted


 


 5/22/18 11:50


Sputum Acid Fast Bacilli Culture


Pending Resulted











Labs








Test


  5/22/18


00:30 5/22/18


01:55 5/22/18


08:16 5/22/18


11:00


 


White Blood Count


  14.6 K/UL


(4.8-10.8) 


  


  


 


 


Red Blood Count


  4.21 M/UL


(4.70-6.10) 


  


  


 


 


Hemoglobin


  13.9 G/DL


(14.2-18.0) 


  


  


 


 


Hematocrit


  40.1 %


(42.0-52.0) 


  


  


 


 


Mean Corpuscular Volume 95 FL (80-99)    


 


Mean Corpuscular Hemoglobin


  33.1 PG


(27.0-31.0) 


  


  


 


 


Mean Corpuscular Hemoglobin


Concent 34.7 G/DL


(32.0-36.0) 


  


  


 


 


Red Cell Distribution Width


  10.5 %


(11.6-14.8) 


  


  


 


 


Platelet Count


  320 K/UL


(150-450) 


  


  


 


 


Mean Platelet Volume


  8.0 FL


(6.5-10.1) 


  


  


 


 


Neutrophils (%) (Auto)


  74.2 %


(45.0-75.0) 


  


  


 


 


Lymphocytes (%) (Auto)


  16.6 %


(20.0-45.0) 


  


  


 


 


Monocytes (%) (Auto)


  7.6 %


(1.0-10.0) 


  


  


 


 


Eosinophils (%) (Auto)


  0.9 %


(0.0-3.0) 


  


  


 


 


Basophils (%) (Auto)


  0.7 %


(0.0-2.0) 


  


  


 


 


Sodium Level


  138 MMOL/L


(136-145) 


  


  


 


 


Potassium Level


  4.3 MMOL/L


(3.5-5.1) 


  


  


 


 


Chloride Level


  104 MMOL/L


() 


  


  


 


 


Carbon Dioxide Level


  23 MMOL/L


(21-32) 


  


  


 


 


Anion Gap


  11 mmol/L


(5-15) 


  


  


 


 


Blood Urea Nitrogen


  24 mg/dL


(7-18) 


  


  


 


 


Creatinine


  1.0 MG/DL


(0.55-1.30) 


  


  


 


 


Estimat Glomerular Filtration


Rate > 60 mL/min


(>60) 


  


  


 


 


Glucose Level


  123 MG/DL


() 


  


  


 


 


Lactic Acid Level


  1.10 mmol/L


(0.66-2.22) 


  


  


 


 


Calcium Level


  9.1 MG/DL


(8.5-10.1) 


  


  


 


 


Total Bilirubin


  0.6 MG/DL


(0.2-1.0) 


  


  


 


 


Aspartate Amino Transf


(AST/SGOT) 21 U/L (15-37) 


  


  


  


 


 


Alanine Aminotransferase


(ALT/SGPT) 24 U/L (12-78) 


  


  


  


 


 


Alkaline Phosphatase


  77 U/L


() 


  


  


 


 


Total Creatine Kinase


  45 U/L


() 


  


  


 


 


Creatine Kinase MB


  0.5 NG/ML


(0.0-3.6) 


  


  


 


 


Creatine Kinase MB Relative


Index 1.1 


  


  


  


 


 


Troponin I


  0.000 ng/mL


(0.000-0.056) 


  


  


 


 


Total Protein


  7.9 G/DL


(6.4-8.2) 


  


  


 


 


Albumin


  3.4 G/DL


(3.4-5.0) 


  


  


 


 


Globulin 4.5 g/dL    


 


Albumin/Globulin Ratio 0.8 (1.0-2.7)    


 


Urine Color  Yellow   


 


Urine Appearance  Clear   


 


Urine pH  5 (4.5-8.0)   


 


Urine Specific Gravity


  


  1.020


(1.005-1.035) 


  


 


 


Urine Protein


  


  Negative


(NEGATIVE) 


  


 


 


Urine Glucose (UA)


  


  Negative


(NEGATIVE) 


  


 


 


Urine Ketones


  


  Negative


(NEGATIVE) 


  


 


 


Urine Occult Blood


  


  Negative


(NEGATIVE) 


  


 


 


Urine Nitrite


  


  Negative


(NEGATIVE) 


  


 


 


Urine Bilirubin


  


  Negative


(NEGATIVE) 


  


 


 


Urine Urobilinogen


  


  Normal MG/DL


(0.0-1.0) 


  


 


 


Urine Leukocyte Esterase


  


  Negative


(NEGATIVE) 


  


 


 


Erythrocyte Sedimentation Rate


  


  


  63 MM/HR


(0-20) 


 


 


Hemoglobin A1c


  


  


  


  4.9 %


(4.3-6.0)


 


C-Reactive Protein,


Quantitative 


  


  


  7.0 mg/dL


(0.00-0.90)


 


Triglycerides Level


  


  


  


  91 MG/DL


()


 


Cholesterol Level


  


  


  


  130 MG/DL (<


200)


 


LDL Cholesterol


  


  


  


  90 mg/dL


(<100)


 


HDL Cholesterol


  


  


  


  24 MG/DL


(40-60)


 


Cholesterol/HDL Ratio    5.4 (3.3-4.4) 


 


Anti-Nuclear Antibody Screen


  


  


  


  Negative


(Negative)


 


TB Test (T-Spot)    Negative 


 


TB Test Nil Control (T-Spot)    0 


 


TB Test Panel A (T-Spot)    0 


 


TB Test Panel B (T-Spot)    0 


 


TB Test Positive Control


(T-Spot) 


  


  


  0 


 


 


Test


  5/22/18


11:50 5/22/18


11:51 5/22/18


16:20 5/23/18


07:20


 


Troponin I


  


  


  0.000 ng/mL


(0.000-0.056) 


 


 


White Blood Count


  


  


  


  12.4 K/UL


(4.8-10.8)


 


Red Blood Count


  


  


  


  3.65 M/UL


(4.70-6.10)


 


Hemoglobin


  


  


  


  12.1 G/DL


(14.2-18.0)


 


Hematocrit


  


  


  


  34.7 %


(42.0-52.0)


 


Mean Corpuscular Volume    95 FL (80-99) 


 


Mean Corpuscular Hemoglobin


  


  


  


  33.1 PG


(27.0-31.0)


 


Mean Corpuscular Hemoglobin


Concent 


  


  


  34.8 G/DL


(32.0-36.0)


 


Red Cell Distribution Width


  


  


  


  10.4 %


(11.6-14.8)


 


Platelet Count


  


  


  


  292 K/UL


(150-450)


 


Mean Platelet Volume


  


  


  


  8.4 FL


(6.5-10.1)


 


Neutrophils (%) (Auto)


  


  


  


  67.0 %


(45.0-75.0)


 


Lymphocytes (%) (Auto)


  


  


  


  23.6 %


(20.0-45.0)


 


Monocytes (%) (Auto)


  


  


  


  7.7 %


(1.0-10.0)


 


Eosinophils (%) (Auto)


  


  


  


  1.2 %


(0.0-3.0)


 


Basophils (%) (Auto)


  


  


  


  0.5 %


(0.0-2.0)


 


Prothrombin Time


  


  


  


  11.0 SEC


(9.30-11.50)


 


Prothromb Time International


Ratio 


  


  


  1.1 (0.9-1.1) 


 


 


Activated Partial


Thromboplast Time 


  


  


  33 SEC (23-33) 


 


 


Sodium Level


  


  


  


  137 MMOL/L


(136-145)


 


Potassium Level


  


  


  


  3.9 MMOL/L


(3.5-5.1)


 


Chloride Level


  


  


  


  105 MMOL/L


()


 


Carbon Dioxide Level


  


  


  


  24 MMOL/L


(21-32)


 


Anion Gap


  


  


  


  8 mmol/L


(5-15)


 


Blood Urea Nitrogen


  


  


  


  19 mg/dL


(7-18)


 


Creatinine


  


  


  


  1.0 MG/DL


(0.55-1.30)


 


Estimat Glomerular Filtration


Rate 


  


  


  > 60 mL/min


(>60)


 


Glucose Level


  


  


  


  109 MG/DL


()


 


Calcium Level


  


  


  


  8.5 MG/DL


(8.5-10.1)


 


Test


  5/24/18


08:00 


  


  


 


 


Vancomycin Level Trough


  12.6 ug/mL


(5.0-12.0) 


  


  


 








Laboratory Tests








Test


  5/24/18


08:00


 


Vancomycin Level Trough


  12.6 ug/mL


(5.0-12.0)  H











Current Medications








 Medications


  (Trade)  Dose


 Ordered  Sig/Yasmani


 Route


 PRN Reason  Start Time


 Stop Time Status Last Admin


Dose Admin


 


 Acetaminophen


  (Tylenol)  650 mg  Q6H  PRN


 ORAL


 Mild Pain/Temp > 100.5  5/22/18 12:45


 6/21/18 12:44  5/23/18 03:38


 


 


 Acetaminophen/


 Hydrocodone Bitart


  (Norco 10/325)  1 tab  Q4H  PRN


 ORAL


 Moderate Pain (Pain Scale 4-6)  5/22/18 12:45


 5/29/18 12:44  5/24/18 08:25


 


 


 Albuterol/


 Ipratropium


  (Albuterol/


 Ipratropium)  3 ml  Q4H  PRN


 HHN


 Shortness of Breath  5/22/18 08:30


 5/27/18 08:29   


 


 


 Azithromycin


  (Zithromax)  250 mg  DAILY


 ORAL


   5/23/18 09:00


 5/30/18 23:59  5/24/18 08:25


 


 


 Guaifenesin


  (Mucinex ER)  600 mg  TWICE A  DAY


 ORAL


   5/22/18 09:00


 6/21/18 08:59  5/24/18 08:25


 


 


 Guaifenesin


  (Robitussin)  100 mg  Q4H  PRN


 ORAL


 For Cough  5/22/18 08:30


 6/21/18 08:29   


 


 


 Heparin Sodium


  (Porcine)


  (Heparin 5000


 units/ml)  5,000 units  EVERY 12  HOURS


 SUBQ


   5/22/18 09:00


 6/21/18 08:59  5/24/18 08:29


 


 


 Lorazepam


  (Ativan)  2 mg  Q6H  PRN


 ORAL


 For Anxiety  5/22/18 12:15


 5/29/18 12:14   


 


 


 Morphine Sulfate


  (Morphine


 Sulfate)  4 mg  Q4H  PRN


 IVP


 Severe Pain (Pain Scale 7-10)  5/22/18 12:45


 5/29/18 12:44   


 


 


 Nicotine


  (Nicoderm)  1 patch  Q24H


 TDERMAL


   5/22/18 12:00


 6/21/18 11:59  5/24/18 11:17


 


 


 Ondansetron HCl


  (Zofran)  4 mg  Q6H  PRN


 IVP


 Nausea & Vomiting  5/24/18 13:43


 6/23/18 13:42  5/24/18 13:51


 


 


 Piperacillin Sod/


 Tazobactam Sod


 3.375 gm/Sodium


 Chloride  110 ml @ 


 27.5 mls/hr  EVERY 8  HOURS


 IVPB


   5/22/18 14:00


 5/27/18 13:59  5/24/18 13:51


 

















QIAN VILLA May 24, 2018 15:07

## 2018-05-24 NOTE — PULMONOLOGY PROGRESS NOTE
Assessment/Plan


Problems:  


(1) Community acquired bacterial pneumonia


(2) Cavitary pneumonia


(3) Sepsis due to pneumonia


(4) Tobacco abuse


Assessment/Plan


ASSESSMENT: 


* 4 cm RLL cavitary mass with R hilar LAD, CAP vs neoplasm


* Extensive daily smoker


* B GGO's and CM


* Periorbital Xanthomas





PLAN: 


* R/O TB with AFB x 3


* F/U sputum Cx


* F/U Atypical infectious and inflammatory serologies  


* Zosyn, Azithro & Vanco (D3) per ID


* PRN HHN's


* Will hold off on CTGBx ----> Discussed with patient, will complete course of 

Abx then will get PET/CT in 4 weeks @ [facility]


* Optimize pulmonary hygiene/mobilize as tolerated


* Nicotine patch


* Discuss tobacco cessation


* Monitor volumes


* Outpatient PFT's


* DVT Px: Hep SQ


* FC


* Can F/U with me in 1-2 weeks after D/C or earlier PRN





Subjective


Allergies:  


Coded Allergies:  


     No Known Allergies (Unverified , 5/21/18)


Subjective


AFVSS, stable on RA


less cough, productive of yellow phlegm


No F, + chills at nights, no N/VD/C





Objective





Last 24 Hour Vital Signs








  Date Time  Temp Pulse Resp B/P (MAP) Pulse Ox O2 Delivery O2 Flow Rate FiO2


 


5/24/18 08:00 97.3 83 20 99/66 97 Room Air  





 97.3       


 


5/24/18 04:00 98.8 73 20 113/73 97   





 98.8       


 


5/24/18 00:00 98.3 72 20 98/63 96   





 98.3       


 


5/23/18 20:00 100.3 86 20 111/66 96   





 100.3       


 


5/23/18 19:56  88 20   Room Air  21


 


5/23/18 16:10 97.1 83 20 117/67 97   





 97.1       


 


5/23/18 12:00 97.0 60 21 109/69 95 Room Air  





 97.0       


 


5/23/18 10:20  93 20   Room Air  21

















Intake and Output  


 


 5/23/18 5/24/18





 19:00 07:00


 


Intake Total 517.500 ml 793.334 ml


 


Output Total  350 ml


 


Balance 517.500 ml 443.334 ml


 


  


 


Intake Oral 240 ml 350 ml


 


IV Total 277.500 ml 443.334 ml


 


Output Urine Total  350 ml


 


# Voids  7


 


# Bowel Movements  1








General Appearance:  WD/WN, no acute distress


HEENT:  normocephalic, atraumatic, anicteric, mucous membranes moist


Respiratory/Chest:  rhonchi


Cardiovascular:  normal peripheral pulses, normal rate, regular rhythm


Abdomen:  normal bowel sounds, soft, non tender, no organomegaly, non distended


Extremities:  no cyanosis, no clubbing, no edema





Microbiology








 Date/Time


Source Procedure


Growth Status


 


 


 5/22/18 00:35


Blood Blood Culture - Preliminary


NO GROWTH AFTER 48 HOURS Resulted


 


 5/22/18 00:30


Blood Blood Culture - Preliminary


NO GROWTH AFTER 48 HOURS Resulted





 5/22/18 11:50


Sputum Gram Stain


Pending Resulted


 


 5/22/18 11:50


Sputum Sputum Culture - Preliminary


NORMAL UPPER RESPIRATORY FRANCIE AT 48 ... Resulted





 5/22/18 11:50


Sputum AFB Specimen Processing Tissue - Final Resulted





 5/22/18 11:50


Sputum Acid Fast Bacilli Smear - Final Resulted


 


 5/22/18 11:50


Sputum Acid Fast Bacilli Culture


Pending Resulted








Laboratory Tests


5/24/18 08:00: Vancomycin Level Trough 12.6H





Current Medications








 Medications


  (Trade)  Dose


 Ordered  Sig/Yasmani


 Route


 PRN Reason  Start Time


 Stop Time Status Last Admin


Dose Admin


 


 Acetaminophen


  (Tylenol)  650 mg  Q6H  PRN


 ORAL


 Mild Pain/Temp > 100.5  5/22/18 12:45


 6/21/18 12:44  5/23/18 03:38


 


 


 Acetaminophen/


 Hydrocodone Bitart


  (Norco 10/325)  1 tab  Q4H  PRN


 ORAL


 Moderate Pain (Pain Scale 4-6)  5/22/18 12:45


 5/29/18 12:44  5/24/18 08:25


 


 


 Albuterol/


 Ipratropium


  (Albuterol/


 Ipratropium)  3 ml  Q4H  PRN


 HHN


 Shortness of Breath  5/22/18 08:30


 5/27/18 08:29   


 


 


 Azithromycin


  (Zithromax)  250 mg  DAILY


 ORAL


   5/23/18 09:00


 5/30/18 23:59  5/24/18 08:25


 


 


 Guaifenesin


  (Mucinex ER)  600 mg  TWICE A  DAY


 ORAL


   5/22/18 09:00


 6/21/18 08:59  5/24/18 08:25


 


 


 Guaifenesin


  (Robitussin)  100 mg  Q4H  PRN


 ORAL


 For Cough  5/22/18 08:30


 6/21/18 08:29   


 


 


 Heparin Sodium


  (Porcine)


  (Heparin 5000


 units/ml)  5,000 units  EVERY 12  HOURS


 SUBQ


   5/22/18 09:00


 6/21/18 08:59  5/24/18 08:29


 


 


 Lorazepam


  (Ativan)  2 mg  Q6H  PRN


 ORAL


 For Anxiety  5/22/18 12:15


 5/29/18 12:14   


 


 


 Morphine Sulfate


  (Morphine


 Sulfate)  4 mg  Q4H  PRN


 IVP


 Severe Pain (Pain Scale 7-10)  5/22/18 12:45


 5/29/18 12:44   


 


 


 Nicotine


  (Nicoderm)  1 patch  Q24H


 TDERMAL


   5/22/18 12:00


 6/21/18 11:59  5/23/18 12:27


 


 


 Piperacillin Sod/


 Tazobactam Sod


 3.375 gm/Sodium


 Chloride  110 ml @ 


 27.5 mls/hr  EVERY 8  HOURS


 IVPB


   5/22/18 14:00


 5/27/18 13:59  5/24/18 05:51


 


 


 Vancomycin HCl


  (Vanco rx to


 dose)  1 ea  DAILY  PRN


 MISC


 Per rx protocol  5/22/18 20:15


 6/21/18 20:14   


 


 


 Vancomycin HCl 1


 gm/Dextrose  275 ml @ 


 183.708


 mls/hr  Q8HR@0100,0900,1700


 IVPB


   5/24/18 17:00


 5/29/18 16:59   


 


 


 Vancomycin HCl/


 Dextrose  250 ml @ 


 166.667


 mls/hr  Q12H


 IVPB


   5/23/18 09:00


 5/24/18 16:59  5/23/18 21:09


 

















IVY MADSEN M.D. May 24, 2018 09:53

## 2018-05-25 VITALS — DIASTOLIC BLOOD PRESSURE: 66 MMHG | SYSTOLIC BLOOD PRESSURE: 116 MMHG

## 2018-05-25 VITALS — SYSTOLIC BLOOD PRESSURE: 123 MMHG | DIASTOLIC BLOOD PRESSURE: 66 MMHG

## 2018-05-25 VITALS — SYSTOLIC BLOOD PRESSURE: 112 MMHG | DIASTOLIC BLOOD PRESSURE: 68 MMHG

## 2018-05-25 VITALS — SYSTOLIC BLOOD PRESSURE: 129 MMHG | DIASTOLIC BLOOD PRESSURE: 71 MMHG

## 2018-05-25 VITALS — SYSTOLIC BLOOD PRESSURE: 100 MMHG | DIASTOLIC BLOOD PRESSURE: 65 MMHG

## 2018-05-25 VITALS — SYSTOLIC BLOOD PRESSURE: 98 MMHG | DIASTOLIC BLOOD PRESSURE: 70 MMHG

## 2018-05-25 LAB
ADD MANUAL DIFF: NO
ANION GAP SERPL CALC-SCNC: 6 MMOL/L (ref 5–15)
BASOPHILS NFR BLD AUTO: 0.7 % (ref 0–2)
BUN SERPL-MCNC: 15 MG/DL (ref 7–18)
CALCIUM SERPL-MCNC: 8.9 MG/DL (ref 8.5–10.1)
CHLORIDE SERPL-SCNC: 105 MMOL/L (ref 98–107)
CO2 SERPL-SCNC: 28 MMOL/L (ref 21–32)
CREAT SERPL-MCNC: 1.3 MG/DL (ref 0.55–1.3)
EOSINOPHIL NFR BLD AUTO: 2.3 % (ref 0–3)
ERYTHROCYTE [DISTWIDTH] IN BLOOD BY AUTOMATED COUNT: 10.3 % (ref 11.6–14.8)
HCT VFR BLD CALC: 36.8 % (ref 42–52)
HGB BLD-MCNC: 12.3 G/DL (ref 14.2–18)
LYMPHOCYTES NFR BLD AUTO: 26.3 % (ref 20–45)
MCV RBC AUTO: 96 FL (ref 80–99)
MONOCYTES NFR BLD AUTO: 7.1 % (ref 1–10)
NEUTROPHILS NFR BLD AUTO: 63.6 % (ref 45–75)
PLATELET # BLD: 346 K/UL (ref 150–450)
POTASSIUM SERPL-SCNC: 3.8 MMOL/L (ref 3.5–5.1)
RBC # BLD AUTO: 3.85 M/UL (ref 4.7–6.1)
SODIUM SERPL-SCNC: 138 MMOL/L (ref 136–145)
WBC # BLD AUTO: 8.1 K/UL (ref 4.8–10.8)

## 2018-05-25 RX ADMIN — GUAIFENESIN SCH MG: 600 TABLET, EXTENDED RELEASE ORAL at 17:15

## 2018-05-25 RX ADMIN — GUAIFENESIN SCH MG: 600 TABLET, EXTENDED RELEASE ORAL at 08:51

## 2018-05-25 RX ADMIN — DEXTROSE MONOHYDRATE SCH MLS/HR: 50 INJECTION, SOLUTION INTRAVENOUS at 14:34

## 2018-05-25 RX ADMIN — HEPARIN SODIUM SCH UNITS: 5000 INJECTION INTRAVENOUS; SUBCUTANEOUS at 21:54

## 2018-05-25 RX ADMIN — DEXTROSE MONOHYDRATE SCH MLS/HR: 50 INJECTION, SOLUTION INTRAVENOUS at 05:56

## 2018-05-25 RX ADMIN — AZITHROMYCIN DIHYDRATE SCH MG: 250 TABLET, FILM COATED ORAL at 08:51

## 2018-05-25 RX ADMIN — HEPARIN SODIUM SCH UNITS: 5000 INJECTION INTRAVENOUS; SUBCUTANEOUS at 09:03

## 2018-05-25 RX ADMIN — DEXTROSE MONOHYDRATE SCH MLS/HR: 50 INJECTION, SOLUTION INTRAVENOUS at 21:52

## 2018-05-25 NOTE — INFECTIOUS DISEASES PROG NOTE
Assessment/Plan


Assessment/Plan








A)


   1) pneumonia, ? neoplasm, ? cap, ? atypical, ? fungal, ? tuberculosis,? post-

obstructive/anaerobic





      - clinically better, no sob


      - leukocytosis and fevers better 


      - tb-spot negative, afb smear negative x 3, tb pcr pending/


      - low suspicion for tuberculosis


      - mycoplasma igm negative, legionella urine antigen negative


      


   2) ? sepsis, leukocytosis, fevers - fevers and leukocytosis better


   3) pmh o/w negative 


   4) allergies - nkda, sh - + smoking, fh-nc, mar noted, notes and records 

noted


   5) d/w RN





P)


   1) zosyn - day # 4 abx, discontinue azithromycin


   2) consider discharge on oral augmentin upon discharge, plan on 10 day total 

of abx since severe pna


   3) no biopsy for now, f/u ct in future, f/u on labs and serology, check 

tuberculosis pcr


   4) d/w Dr. Abreu 


   5) d/w Ana María Herndon, 


   6) orders noted and entered 


   7) d/w with ICP


   8) consider discontinue isolation, low suspicion for tuberculosis pna, afb 

smear negative x 3, t-spot negative - will d/w primary care and pulmonary





Subjective


Constitutional:  Reports: fatigue; Denies: fever


HEENT:  Denies: congestion


Respiratory:  Denies: shortness of breath


Cardiovascular:  Denies: chest pain


Gastrointestinal/Abdominal:  Denies: nausea, vomiting, diarrhea


Genitourinary:  Denies: dysuria


Neurologic:  Denies: headache


Psychiatric:  Denies: depression


Skin:  Denies: rash


Hematologic:  Denies: bleeding


Musculoskeletal:  Denies: pain


Allergies:  


Coded Allergies:  


     No Known Allergies (Unverified , 5/21/18)





Objective


Vital Signs





Last 24 Hour Vital Signs








  Date Time  Temp Pulse Resp B/P (MAP) Pulse Ox O2 Delivery O2 Flow Rate FiO2


 


5/25/18 16:00 97.7 78 20 98/70 97   





 97.7       


 


5/25/18 12:00 98.0 78 20 123/66 97   





 98.0       


 


5/25/18 08:31  84 18   Room Air  21


 


5/25/18 08:00 97.2 80 20 100/65 96   





 97.2       


 


5/25/18 04:00 98.9 73 20 116/66 96   





 98.9       


 


5/25/18 00:00 99.0 83 20 129/71 98   





 99.0       


 


5/24/18 20:00 98.9 82 20 114/69 95   





 98.9       


 


5/24/18 19:30  82 20   Room Air  21








Height (Feet):  5


Height (Inches):  8.00


Weight (Pounds):  182


General Appearance:  no acute distress


HEENT:  normocephalic, atraumatic, anicteric, mucous membranes moist


Respiratory/Chest:  no respiratory distress, no accessory muscle use, crackles/

rales - few, right > left


Cardiovascular:  normal rate, regular rhythm, no gallop/murmur, no JVD


Abdomen:  normal bowel sounds, soft, non tender, no organomegaly, non distended


Genitourinary:  other - no ruiz


Extremities:  no cyanosis


Skin:  no rash


Neurologic/Psychiatric:  CNs II-XII grossly normal, no motor/sensory deficits, 

alert, oriented x 3, responsive


Lymphatic:  no neck adenopathy


Musculoskeletal:  no effusion


Objective





Chest x-ray - 5/22 - 





Impression: Right midlung mass with subtle central cavitation, also described on


subsequent chest CT. Neoplastic versus inflammatory


 


Hypoventilatory exam with bilateral basilar atelectasis


 





CT chest:





Impression: Suboptimal opacification of the pulmonary arteries, distal emboli 

could


be missed. No evidence of large vessel central pulmonary embolus demonstrated


 


4.5 x 3.9 x 4 cm cavitary mass in the periphery of the right lower lobe. This 

may be


either neoplastic or inflammatory.


 


Right hilar lymphadenopathy, likely related to the above. This could likewise be


neoplastic or inflammatory.


 


Bilateral reticular interstitial and groundglass opacities. Nonspecific, 

suspect on


the basis of pulmonary edema, but could also be infectious or noninfectious


inflammatory


 


Trace right pleural effusion, cm, ? cholelithiasis 


 





Chest x-ray - 5/24 - 





Findings:


 


Cavitary lesion again demonstrated within the right midlung. Heart size is 

normal. No


pleural effusion identified. Bones are unremarkable.


 


IMPRESSION:


 


Cavitary mass in the right midlung. No change





Microbiology








 Date/Time


Source Procedure


Growth Status


 


 


 5/24/18 06:30


Sputum AFB Specimen Processing Tissue - Final Resulted





 5/24/18 06:30


Sputum Acid Fast Bacilli Smear - Final Resulted


 


 5/24/18 06:30


Sputum Acid Fast Bacilli Culture


Pending Resulted





 5/23/18 06:00


Sputum Not Specified AFB Specimen Processing Tissue - Final Resulted





 5/23/18 06:00


Sputum Not Specified Acid Fast Bacilli Smear - Final Resulted


 


 5/23/18 06:00


Sputum Not Specified Acid Fast Bacilli Culture


Pending Resulted











Laboratory Tests








Test


  5/25/18


10:00


 


White Blood Count


  8.1 K/UL


(4.8-10.8)


 


Red Blood Count


  3.85 M/UL


(4.70-6.10)  L


 


Hemoglobin


  12.3 G/DL


(14.2-18.0)  L


 


Hematocrit


  36.8 %


(42.0-52.0)  L


 


Mean Corpuscular Volume 96 FL (80-99)  


 


Mean Corpuscular Hemoglobin


  32.0 PG


(27.0-31.0)  H


 


Mean Corpuscular Hemoglobin


Concent 33.5 G/DL


(32.0-36.0)


 


Red Cell Distribution Width


  10.3 %


(11.6-14.8)  L


 


Platelet Count


  346 K/UL


(150-450)


 


Mean Platelet Volume


  7.4 FL


(6.5-10.1)


 


Neutrophils (%) (Auto)


  63.6 %


(45.0-75.0)


 


Lymphocytes (%) (Auto)


  26.3 %


(20.0-45.0)


 


Monocytes (%) (Auto)


  7.1 %


(1.0-10.0)


 


Eosinophils (%) (Auto)


  2.3 %


(0.0-3.0)


 


Basophils (%) (Auto)


  0.7 %


(0.0-2.0)


 


Sodium Level


  138 MMOL/L


(136-145)


 


Potassium Level


  3.8 MMOL/L


(3.5-5.1)


 


Chloride Level


  105 MMOL/L


()


 


Carbon Dioxide Level


  28 MMOL/L


(21-32)


 


Anion Gap


  6 mmol/L


(5-15)


 


Blood Urea Nitrogen


  15 mg/dL


(7-18)


 


Creatinine


  1.3 MG/DL


(0.55-1.30)


 


Estimat Glomerular Filtration


Rate 57.5 mL/min


(>60)


 


Glucose Level


  149 MG/DL


()  H


 


Calcium Level


  8.9 MG/DL


(8.5-10.1)


 


HIV (1&2) Antibody Rapid


  Negative


(NEGATIVE)











Current Medications








 Medications


  (Trade)  Dose


 Ordered  Sig/Yasmani


 Route


 PRN Reason  Start Time


 Stop Time Status Last Admin


Dose Admin


 


 Acetaminophen


  (Tylenol)  650 mg  Q6H  PRN


 ORAL


 Mild Pain/Temp > 100.5  5/22/18 12:45


 6/21/18 12:44  5/23/18 03:38


 


 


 Acetaminophen/


 Hydrocodone Bitart


  (Norco 10/325)  1 tab  Q4H  PRN


 ORAL


 Moderate Pain (Pain Scale 4-6)  5/22/18 12:45


 5/29/18 12:44  5/24/18 08:25


 


 


 Albuterol/


 Ipratropium


  (Albuterol/


 Ipratropium)  3 ml  Q4H  PRN


 HHN


 Shortness of Breath  5/22/18 08:30


 5/27/18 08:29   


 


 


 Guaifenesin


  (Mucinex ER)  600 mg  TWICE A  DAY


 ORAL


   5/22/18 09:00


 6/21/18 08:59  5/25/18 17:15


 


 


 Guaifenesin


  (Robitussin)  100 mg  Q4H  PRN


 ORAL


 For Cough  5/22/18 08:30


 6/21/18 08:29   


 


 


 Heparin Sodium


  (Porcine)


  (Heparin 5000


 units/ml)  5,000 units  EVERY 12  HOURS


 SUBQ


   5/22/18 09:00


 6/21/18 08:59  5/25/18 09:03


 


 


 Lorazepam


  (Ativan)  2 mg  Q6H  PRN


 ORAL


 For Anxiety  5/22/18 12:15


 5/29/18 12:14   


 


 


 Morphine Sulfate


  (Morphine


 Sulfate)  4 mg  Q4H  PRN


 IVP


 Severe Pain (Pain Scale 7-10)  5/22/18 12:45


 5/29/18 12:44   


 


 


 Nicotine


  (Nicoderm)  1 patch  Q24H


 TDERMAL


   5/22/18 12:00


 6/21/18 11:59  5/25/18 12:52


 


 


 Ondansetron HCl


  (Zofran)  4 mg  Q6H  PRN


 IVP


 Nausea & Vomiting  5/24/18 13:43


 6/23/18 13:42  5/24/18 13:51


 


 


 Piperacillin Sod/


 Tazobactam Sod


 3.375 gm/Sodium


 Chloride  110 ml @ 


 27.5 mls/hr  EVERY 8  HOURS


 IVPB


   5/22/18 14:00


 5/27/18 13:59  5/25/18 14:34


 

















Maggie Gabriel MD May 25, 2018 19:28

## 2018-05-25 NOTE — PULMONOLOGY PROGRESS NOTE
Assessment/Plan


Problems:  


(1) Community acquired bacterial pneumonia


(2) Cavitary pneumonia


(3) Sepsis due to pneumonia


(4) Tobacco abuse


Assessment/Plan


ASSESSMENT: 


* 4 cm RLL cavitary mass with R hilar LAD, CAP vs neoplasm


* Extensive daily smoker


* B GGO's and CM


* Periorbital Xanthomas





PLAN: 


* R/O TB with AFB x 3


* F/U sputum Cx


* F/U Atypical infectious and inflammatory serologies  


* Zosyn (D4) per ID


* PRN HHN's


* Will hold off on CTGBx ----> Discussed with patient, will complete course of 

Abx then will get PET/CT in 4 weeks @ [facility]


* Optimize pulmonary hygiene/mobilize as tolerated


* Nicotine patch


* Discuss tobacco cessation


* Monitor volumes


* Outpatient PFT's


* DVT Px: Hep SQ


* FC


* Can F/U with me in 1-2 weeks after D/C or earlier PRN





Subjective


Allergies:  


Coded Allergies:  


     No Known Allergies (Unverified , 5/21/18)


Subjective


AFVSS, stable on RA


less cough, productive of yellow phlegm


No F, less chills at nights, no N/VD/C





Objective





Last 24 Hour Vital Signs








  Date Time  Temp Pulse Resp B/P (MAP) Pulse Ox O2 Delivery O2 Flow Rate FiO2


 


5/25/18 08:31  84 18   Room Air  21


 


5/25/18 04:00 98.9 73 20 116/66 96   





 98.9       


 


5/25/18 00:00 99.0 83 20 129/71 98   





 99.0       


 


5/24/18 20:00 98.9 82 20 114/69 95   





 98.9       


 


5/24/18 19:30  82 20   Room Air  21


 


5/24/18 16:00 98.0 75 20 96/63 97 Room Air  





 98.0       


 


5/24/18 12:00 97.3 80 20 117/69 100 Room Air  





 97.3       


 


5/24/18 10:12  78 16   Room Air  21

















Intake and Output  


 


 5/24/18 5/25/18





 19:00 07:00


 


Intake Total 350.0 ml 10 ml


 


Balance 350.0 ml 10 ml


 


  


 


Intake Oral 240 ml 10 ml


 


IV Total 110.0 ml 


 


# Voids  1


 


# Bowel Movements  1








General Appearance:  WD/WN, no acute distress


HEENT:  normocephalic, atraumatic, anicteric, mucous membranes moist


Respiratory/Chest:  chest wall non-tender, lungs clear, normal breath sounds, 

no respiratory distress


Cardiovascular:  normal peripheral pulses, normal rate, regular rhythm


Abdomen:  normal bowel sounds, soft, non tender, no organomegaly, non distended


Extremities:  no cyanosis, no clubbing, no edema





Microbiology








 Date/Time


Source Procedure


Growth Status


 


 


 5/22/18 11:50


Sputum Gram Stain - Final Complete


 


 5/22/18 11:50


Sputum Sputum Culture - Final


NORMAL UPPER RESPIRATORY FRANCIE PRESENT Complete





 5/22/18 11:50


Sputum AFB Specimen Processing Tissue - Final Resulted





 5/22/18 11:50


Sputum Acid Fast Bacilli Smear - Final Resulted


 


 5/22/18 11:50


Sputum Acid Fast Bacilli Culture


Pending Resulted











Current Medications








 Medications


  (Trade)  Dose


 Ordered  Sig/Yasmani


 Route


 PRN Reason  Start Time


 Stop Time Status Last Admin


Dose Admin


 


 Acetaminophen


  (Tylenol)  650 mg  Q6H  PRN


 ORAL


 Mild Pain/Temp > 100.5  5/22/18 12:45


 6/21/18 12:44  5/23/18 03:38


 


 


 Acetaminophen/


 Hydrocodone Bitart


  (Norco 10/325)  1 tab  Q4H  PRN


 ORAL


 Moderate Pain (Pain Scale 4-6)  5/22/18 12:45


 5/29/18 12:44  5/24/18 08:25


 


 


 Albuterol/


 Ipratropium


  (Albuterol/


 Ipratropium)  3 ml  Q4H  PRN


 HHN


 Shortness of Breath  5/22/18 08:30


 5/27/18 08:29   


 


 


 Azithromycin


  (Zithromax)  250 mg  DAILY


 ORAL


   5/23/18 09:00


 5/30/18 23:59  5/24/18 08:25


 


 


 Guaifenesin


  (Mucinex ER)  600 mg  TWICE A  DAY


 ORAL


   5/22/18 09:00


 6/21/18 08:59  5/24/18 17:35


 


 


 Guaifenesin


  (Robitussin)  100 mg  Q4H  PRN


 ORAL


 For Cough  5/22/18 08:30


 6/21/18 08:29   


 


 


 Heparin Sodium


  (Porcine)


  (Heparin 5000


 units/ml)  5,000 units  EVERY 12  HOURS


 SUBQ


   5/22/18 09:00


 6/21/18 08:59  5/24/18 20:55


 


 


 Lorazepam


  (Ativan)  2 mg  Q6H  PRN


 ORAL


 For Anxiety  5/22/18 12:15


 5/29/18 12:14   


 


 


 Morphine Sulfate


  (Morphine


 Sulfate)  4 mg  Q4H  PRN


 IVP


 Severe Pain (Pain Scale 7-10)  5/22/18 12:45


 5/29/18 12:44   


 


 


 Nicotine


  (Nicoderm)  1 patch  Q24H


 TDERMAL


   5/22/18 12:00


 6/21/18 11:59  5/24/18 11:17


 


 


 Ondansetron HCl


  (Zofran)  4 mg  Q6H  PRN


 IVP


 Nausea & Vomiting  5/24/18 13:43


 6/23/18 13:42  5/24/18 13:51


 


 


 Piperacillin Sod/


 Tazobactam Sod


 3.375 gm/Sodium


 Chloride  110 ml @ 


 27.5 mls/hr  EVERY 8  HOURS


 IVPB


   5/22/18 14:00


 5/27/18 13:59  5/25/18 05:56


 

















IVY MADSEN M.D. May 25, 2018 08:58

## 2018-05-25 NOTE — GENERAL PROGRESS NOTE
Assessment/Plan


Problem List:  


(1) Tobacco abuse


ICD Codes:  Z72.0 - Tobacco use


SNOMED:  299028239


(2) Cavitary pneumonia


ICD Codes:  J18.9 - Pneumonia, unspecified organism; J98.4 - Other disorders of 

lung


SNOMED:  424980179


(3) Sepsis due to pneumonia


ICD Codes:  J18.9 - Pneumonia, unspecified organism; A41.9 - Sepsis, 

unspecified organism


SNOMED:  84718053, [id number]


Status:  stable, progressing


Assessment/Plan


- Pulmonology and ID following, appreciate rec's 


- repeat CXR on 5/24 shows cavitary mass on right lung region, no change


- s/p IV vancomycin (5/22-5/24). Continue IV zosyn and azithromycin (5/22 - )


- sputum cx no growth at 48 hours. will dc IV vancomycin per discussion with ID 


- AFB x 3, r/o TB. AFB #1 negative. Pending #2 and #3. 


- airborne precautions


- f/u blood cx - NGTD


- atypical serologies sent: legionella, cryptococcus, coccidio, histoplasma, 

mycoplasma, M. pneumoniae, Aspergillus


- f/u quantiferon gold -- negative


- duonebs prn 


- Nicotine patch 


- IVF 


- TTE 60-65%


- trops negative. EKG NSR. ACS ruled out. Right-sided chest pain likely 2/2 

pneumonia 


- lipid panel, A1c


- pain and supportive care 


- Patient refused CT-guided bx to evaluate the mass. Patient prefers to have 

this done after the infection resolves. Patient agrees to have a PET/CT in 4 

weeks with close f/u with Dr. Abreu in 1-2 weeks after hospital discharge 


- DISCHARGE PENDING THREE NEGATIVE AFB SMEARS. 





 


DVT Prophylaxis:   SCD, HSQ


Code Status:            Full


Hospital Classification Declaration: Based on this initial evaluation, and 

depending on the patient's clinical course, I anticipate that this patient will 

require hospitalization for 4-5 days for sepsis, atypical pneumonia and close 

respiratory/hemodynamic monitoring.





Disposition: Once the patient is stable to leave the hospital, I anticipate the 

patient will likely be discharged to the following environment: home with HH





I spent 41 minutes on this patient's case, and 23 minutes were dedicated to 

counseling and/or care coordination. Discussed with patient/family, nursing 

staff, SW/CM, ID, and pulmonologist regarding clinical status, treatment course

, and disposition planning.





Thank you, Dr. Abreu, for this consultation.





Time of note may not reflect time of encounter.





Subjective


Date patient seen:  May 25, 2018


Time patient seen:  11:00


Allergies:  


Coded Allergies:  


     No Known Allergies (Unverified , 5/21/18)


Subjective


- no acute events overnight


- AFB smears pending #2 and #3


- patient frustrated and requesting to go home 


- denies sob, cp. AF, HDS





Objective





Last 24 Hour Vital Signs








  Date Time  Temp Pulse Resp B/P (MAP) Pulse Ox O2 Delivery O2 Flow Rate FiO2


 


5/25/18 12:00 98.0 78 20 123/66 97   





 98.0       


 


5/25/18 08:31  84 18   Room Air  21


 


5/25/18 08:00 97.2 80 20 100/65 96   





 97.2       


 


5/25/18 04:00 98.9 73 20 116/66 96   





 98.9       


 


5/25/18 00:00 99.0 83 20 129/71 98   





 99.0       


 


5/24/18 20:00 98.9 82 20 114/69 95   





 98.9       


 


5/24/18 19:30  82 20   Room Air  21


 


5/24/18 16:00 98.0 75 20 96/63 97 Room Air  





 98.0       

















Intake and Output  


 


 5/24/18 5/25/18





 19:00 07:00


 


Intake Total 350.0 ml 37.5 ml


 


Balance 350.0 ml 37.5 ml


 


  


 


Intake Oral 240 ml 10 ml


 


IV Total 110.0 ml 27.5 ml


 


# Voids  1


 


# Bowel Movements  1








Laboratory Tests


5/25/18 10:00: 


White Blood Count 8.1, Red Blood Count 3.85L, Hemoglobin 12.3L, Hematocrit 36.8L

, Mean Corpuscular Volume 96, Mean Corpuscular Hemoglobin 32.0H, Mean 

Corpuscular Hemoglobin Concent 33.5, Red Cell Distribution Width 10.3L, 

Platelet Count 346, Mean Platelet Volume 7.4, Neutrophils (%) (Auto) 63.6, 

Lymphocytes (%) (Auto) 26.3, Monocytes (%) (Auto) 7.1, Eosinophils (%) (Auto) 

2.3, Basophils (%) (Auto) 0.7, Sodium Level 138, Potassium Level 3.8, Chloride 

Level 105, Carbon Dioxide Level 28, Anion Gap 6, Blood Urea Nitrogen 15, 

Creatinine 1.3, Estimat Glomerular Filtration Rate 57.5, Glucose Level 149H, 

Calcium Level 8.9, HIV (1&2) Antibody Rapid Negative


Height (Feet):  5


Height (Inches):  8.00


Weight (Pounds):  182


General Appearance:  no apparent distress, alert


EENT:  PERRL/EOMI, normal ENT inspection


Neck:  non-tender, normal alignment, supple


Cardiovascular:  normal peripheral pulses, normal rate, regular rhythm


Respiratory/Chest:  chest wall non-tender, lungs clear, normal breath sounds


Abdomen:  normal bowel sounds, non tender, soft


Extremities:  normal range of motion, non-tender


Neurologic:  CNs II-XII grossly normal, no motor/sensory deficits, alert, 

oriented x 3


Skin:  normal pigmentation, warm/dry











Ana María Herndon NP May 25, 2018 15:31

## 2018-05-26 VITALS — DIASTOLIC BLOOD PRESSURE: 70 MMHG | SYSTOLIC BLOOD PRESSURE: 113 MMHG

## 2018-05-26 VITALS — SYSTOLIC BLOOD PRESSURE: 115 MMHG | DIASTOLIC BLOOD PRESSURE: 74 MMHG

## 2018-05-26 VITALS — SYSTOLIC BLOOD PRESSURE: 107 MMHG | DIASTOLIC BLOOD PRESSURE: 72 MMHG

## 2018-05-26 VITALS — SYSTOLIC BLOOD PRESSURE: 117 MMHG | DIASTOLIC BLOOD PRESSURE: 81 MMHG

## 2018-05-26 VITALS — DIASTOLIC BLOOD PRESSURE: 77 MMHG | SYSTOLIC BLOOD PRESSURE: 120 MMHG

## 2018-05-26 VITALS — DIASTOLIC BLOOD PRESSURE: 84 MMHG | SYSTOLIC BLOOD PRESSURE: 149 MMHG

## 2018-05-26 LAB
ADD MANUAL DIFF: NO
ANION GAP SERPL CALC-SCNC: 9 MMOL/L (ref 5–15)
BASOPHILS NFR BLD AUTO: 0.5 % (ref 0–2)
BUN SERPL-MCNC: 16 MG/DL (ref 7–18)
CALCIUM SERPL-MCNC: 9.2 MG/DL (ref 8.5–10.1)
CHLORIDE SERPL-SCNC: 106 MMOL/L (ref 98–107)
CO2 SERPL-SCNC: 25 MMOL/L (ref 21–32)
CREAT SERPL-MCNC: 1.2 MG/DL (ref 0.55–1.3)
EOSINOPHIL NFR BLD AUTO: 1.8 % (ref 0–3)
ERYTHROCYTE [DISTWIDTH] IN BLOOD BY AUTOMATED COUNT: 10.4 % (ref 11.6–14.8)
HCT VFR BLD CALC: 37.6 % (ref 42–52)
HGB BLD-MCNC: 12.7 G/DL (ref 14.2–18)
LYMPHOCYTES NFR BLD AUTO: 25.7 % (ref 20–45)
MCV RBC AUTO: 95 FL (ref 80–99)
MONOCYTES NFR BLD AUTO: 6.2 % (ref 1–10)
NEUTROPHILS NFR BLD AUTO: 65.9 % (ref 45–75)
PLATELET # BLD: 362 K/UL (ref 150–450)
POTASSIUM SERPL-SCNC: 3.9 MMOL/L (ref 3.5–5.1)
RBC # BLD AUTO: 3.98 M/UL (ref 4.7–6.1)
SODIUM SERPL-SCNC: 140 MMOL/L (ref 136–145)
WBC # BLD AUTO: 9.5 K/UL (ref 4.8–10.8)

## 2018-05-26 RX ADMIN — HEPARIN SODIUM SCH UNITS: 5000 INJECTION INTRAVENOUS; SUBCUTANEOUS at 08:33

## 2018-05-26 RX ADMIN — DEXTROSE MONOHYDRATE SCH MLS/HR: 50 INJECTION, SOLUTION INTRAVENOUS at 21:33

## 2018-05-26 RX ADMIN — GUAIFENESIN SCH MG: 600 TABLET, EXTENDED RELEASE ORAL at 08:32

## 2018-05-26 RX ADMIN — DEXTROSE MONOHYDRATE SCH MLS/HR: 50 INJECTION, SOLUTION INTRAVENOUS at 05:36

## 2018-05-26 RX ADMIN — GUAIFENESIN SCH MG: 600 TABLET, EXTENDED RELEASE ORAL at 17:29

## 2018-05-26 RX ADMIN — DEXTROSE MONOHYDRATE SCH MLS/HR: 50 INJECTION, SOLUTION INTRAVENOUS at 13:54

## 2018-05-26 RX ADMIN — HEPARIN SODIUM SCH UNITS: 5000 INJECTION INTRAVENOUS; SUBCUTANEOUS at 21:42

## 2018-05-26 NOTE — INTERNAL MED PROGRESS NOTE
Subjective


Physician Name


Landon Lopez


Attending Physician


Julian Abreu M.D.





Current Medications








 Medications


  (Trade)  Dose


 Ordered  Sig/Yasmani


 Route


 PRN Reason  Start Time


 Stop Time Status Last Admin


Dose Admin


 


 Acetaminophen


  (Tylenol)  650 mg  Q6H  PRN


 ORAL


 Mild Pain/Temp > 100.5  5/22/18 12:45


 6/21/18 12:44  5/23/18 03:38


 


 


 Acetaminophen/


 Hydrocodone Bitart


  (Norco 10/325)  1 tab  Q4H  PRN


 ORAL


 Moderate Pain (Pain Scale 4-6)  5/22/18 12:45


 5/29/18 12:44  5/24/18 08:25


 


 


 Albuterol/


 Ipratropium


  (Albuterol/


 Ipratropium)  3 ml  Q4H  PRN


 HHN


 Shortness of Breath  5/22/18 08:30


 5/27/18 08:29   


 


 


 Guaifenesin


  (Mucinex ER)  600 mg  TWICE A  DAY


 ORAL


   5/22/18 09:00


 6/21/18 08:59  5/26/18 08:32


 


 


 Guaifenesin


  (Robitussin)  100 mg  Q4H  PRN


 ORAL


 For Cough  5/22/18 08:30


 6/21/18 08:29   


 


 


 Heparin Sodium


  (Porcine)


  (Heparin 5000


 units/ml)  5,000 units  EVERY 12  HOURS


 SUBQ


   5/22/18 09:00


 6/21/18 08:59  5/26/18 08:33


 


 


 Lorazepam


  (Ativan)  2 mg  Q6H  PRN


 ORAL


 For Anxiety  5/22/18 12:15


 5/29/18 12:14   


 


 


 Morphine Sulfate


  (Morphine


 Sulfate)  4 mg  Q4H  PRN


 IVP


 Severe Pain (Pain Scale 7-10)  5/22/18 12:45


 5/29/18 12:44   


 


 


 Nicotine


  (Nicoderm)  1 patch  Q24H


 TDERMAL


   5/22/18 12:00


 6/21/18 11:59  5/26/18 12:20


 


 


 Ondansetron HCl


  (Zofran)  4 mg  Q6H  PRN


 IVP


 Nausea & Vomiting  5/24/18 13:43


 6/23/18 13:42  5/24/18 13:51


 


 


 Piperacillin Sod/


 Tazobactam Sod


 3.375 gm/Sodium


 Chloride  110 ml @ 


 27.5 mls/hr  EVERY 8  HOURS


 IVPB


   5/25/18 22:00


 5/30/18 21:59  5/26/18 13:54


 








Allergies:  


Coded Allergies:  


     No Known Allergies (Unverified , 5/21/18)





Objective





Last Vital Signs








  Date Time  Temp Pulse Resp B/P (MAP) Pulse Ox O2 Delivery O2 Flow Rate FiO2


 


5/26/18 12:00 97.5 78 20 120/77 96   





 97.5       


 


5/26/18 09:12      Room Air  21











Laboratory Tests








Test


  5/26/18


10:30


 


White Blood Count


  9.5 K/UL


(4.8-10.8)


 


Red Blood Count


  3.98 M/UL


(4.70-6.10)  L


 


Hemoglobin


  12.7 G/DL


(14.2-18.0)  L


 


Hematocrit


  37.6 %


(42.0-52.0)  L


 


Mean Corpuscular Volume 95 FL (80-99)  


 


Mean Corpuscular Hemoglobin


  32.0 PG


(27.0-31.0)  H


 


Mean Corpuscular Hemoglobin


Concent 33.8 G/DL


(32.0-36.0)


 


Red Cell Distribution Width


  10.4 %


(11.6-14.8)  L


 


Platelet Count


  362 K/UL


(150-450)


 


Mean Platelet Volume


  7.1 FL


(6.5-10.1)


 


Neutrophils (%) (Auto)


  65.9 %


(45.0-75.0)


 


Lymphocytes (%) (Auto)


  25.7 %


(20.0-45.0)


 


Monocytes (%) (Auto)


  6.2 %


(1.0-10.0)


 


Eosinophils (%) (Auto)


  1.8 %


(0.0-3.0)


 


Basophils (%) (Auto)


  0.5 %


(0.0-2.0)


 


Sodium Level


  140 MMOL/L


(136-145)


 


Potassium Level


  3.9 MMOL/L


(3.5-5.1)


 


Chloride Level


  106 MMOL/L


()


 


Carbon Dioxide Level


  25 MMOL/L


(21-32)


 


Anion Gap


  9 mmol/L


(5-15)


 


Blood Urea Nitrogen


  16 mg/dL


(7-18)


 


Creatinine


  1.2 MG/DL


(0.55-1.30)


 


Estimat Glomerular Filtration


Rate > 60 mL/min


(>60)


 


Glucose Level


  107 MG/DL


()  H


 


Calcium Level


  9.2 MG/DL


(8.5-10.1)











Microbiology








 Date/Time


Source Procedure


Growth Status


 


 


 5/24/18 06:30


Sputum AFB Specimen Processing Tissue - Final Resulted





 5/24/18 06:30


Sputum Acid Fast Bacilli Smear - Final Resulted


 


 5/24/18 06:30


Sputum Acid Fast Bacilli Culture


Pending Resulted

















Intake and Output  


 


 5/25/18 5/26/18





 19:00 07:00


 


Intake Total 376.0 ml 110.0 ml


 


Balance 376.0 ml 110.0 ml


 


  


 


Intake Oral 200 ml 


 


IV Total 176.0 ml 110.0 ml


 


# Voids  3











Assessment/Plan


Assessment/Plan


   


 Assessment/Plan 


Assessment/Plan


Problem List:  


(1) Tobacco abuse


ICD Codes:  Z72.0 - Tobacco use


SNOMED:  704053443


(2) Cavitary pneumonia


ICD Codes:  J18.9 - Pneumonia, unspecified organism; J98.4 - Other disorders of 

lung


SNOMED:  632544723


(3) Sepsis due to pneumonia


ICD Codes:  J18.9 - Pneumonia, unspecified organism; A41.9 - Sepsis, 

unspecified organism


SNOMED:  63679057, [id number]


Status:  stable, progressing


Assessment/Plan


- Pulmonology and ID following, appreciate rec's 


- repeat CXR on 5/24 shows cavitary mass on right lung region, no change


- s/p IV vancomycin (5/22-5/24). Continue IV zosyn and azithromycin (5/22 - )


- sputum cx no growth at 48 hours. will dc IV vancomycin per discussion with ID 


- AFB x 3, r/o TB. AFB #1 negative. Pending #2 and #3. 


- airborne precautions


- f/u blood cx - NGTD


- atypical serologies sent: legionella, cryptococcus, coccidio, histoplasma, 

mycoplasma, M. pneumoniae, Aspergillus


- f/u quantiferon gold -- negative


- duonebs prn 


- Nicotine patch 


- IVF 


- TTE 60-65%


- trops negative. EKG NSR. ACS ruled out. Right-sided chest pain likely 2/2 

pneumonia 


- lipid panel, A1c


- pain and supportive care 


- Patient refused CT-guided bx to evaluate the mass. Patient prefers to have 

this done after the infection resolves. Patient agrees to have a PET/CT in 4 

weeks with close f/u with Dr. Abreu in 1-2 weeks after hospital discharge 


- DISCHARGE PENDING THREE NEGATIVE AFB SMEARS. 





 


DVT Prophylaxis:   SCD, HSQ


Code Status:            Full


Hospital Classification Declaration: Based on this initial evaluation, and 

depending on the patient's clinical course, I anticipate that this patient will 

require hospitalization for 4-5 days for sepsis, atypical pneumonia and close 

respiratory/hemodynamic monitoring.





Disposition: Once the patient is stable to leave the hospital, I anticipate the 

patient will likely be discharged to the following environment: home with HH





I spent 41 minutes on this patient's case, and 23 minutes were dedicated to 

counseling and/or care coordination. Discussed with patient/family, nursing 

staff, SW/CM, ID, and pulmonologist regarding clinical status, treatment course

, and disposition planning.





Thank you, Dr. Abreu, for this consultation.





Time of note may not reflect time of encounter.





 Subjective 


Subjective


Date patient seen:  May 25, 2018


Time patient seen:  11:00


Allergies:  


Coded Allergies:  


     No Known Allergies (Unverified , 5/21/18)


Subjective


- no acute events overnight


- AFB smears pending #2 and #3


- patient frustrated and requesting to go home 


- denies sob, cp. AF, HDS





 Objective 


Objective





Last 24 Hour Vital Signs








  Date Time  Temp Pulse Resp B/P (MAP) Pulse Ox O2 Delivery O2 Flow Rate FiO2


 


5/25/18 12:00 98.0 78 20 123/66 97   





 98.0       


 


5/25/18 08:31  84 18   Room Air  21


 


5/25/18 08:00 97.2 80 20 100/65 96   





 97.2       


 


5/25/18 04:00 98.9 73 20 116/66 96   





 98.9       


 


5/25/18 00:00 99.0 83 20 129/71 98   





 99.0       


 


5/24/18 20:00 98.9 82 20 114/69 95   





 98.9       


 


5/24/18 19:30  82 20   Room Air  21


 


5/24/18 16:00 98.0 75 20 96/63 97 Room Air  





 98.0       

















Intake and Output  


 


 5/24/18 5/25/18





 19:00 07:00


 


Intake Total 350.0 ml 37.5 ml


 


Balance 350.0 ml 37.5 ml


 


  


 


Intake Oral 240 ml 10 ml


 


IV Total 110.0 ml 27.5 ml


 


# Voids  1


 


# Bowel Movements  1








Laboratory Tests


5/25/18 10:00: 


White Blood Count 8.1, Red Blood Count 3.85L, Hemoglobin 12.3L, Hematocrit 36.8L

, Mean Corpuscular Volume 96, Mean Corpuscular Hemoglobin 32.0H, Mean 

Corpuscular Hemoglobin Concent 33.5, Red Cell Distribution Width 10.3L, 

Platelet Count 346, Mean Platelet Volume 7.4, Neutrophils (%) (Auto) 63.6, 

Lymphocytes (%) (Auto) 26.3, Monocytes (%) (Auto) 7.1, Eosinophils (%) (Auto) 

2.3, Basophils (%) (Auto) 0.7, Sodium Level 138, Potassium Level 3.8, Chloride 

Level 105, Carbon Dioxide Level 28, Anion Gap 6, Blood Urea Nitrogen 15, 

Creatinine 1.3, Estimat Glomerular Filtration Rate 57.5, Glucose Level 149H, 

Calcium Level 8.9, HIV (1&2) Antibody Rapid Negative


Height (Feet):  5


Height (Inches):  8.00


Weight (Pounds):  182


General Appearance:  no apparent distress, alert


EENT:  PERRL/EOMI, normal ENT inspection


Neck:  non-tender, normal alignment, supple


Cardiovascular:  normal peripheral pulses, normal rate, regular rhythm


Respiratory/Chest:  chest wall non-tender, lungs clear, normal breath sounds


Abdomen:  normal bowel sounds, non tender, soft


Extremities:  normal range of motion, non-tender


Neurologic:  CNs II-XII grossly normal, no motor/sensory deficits, alert, 

oriented x 3


Skin:  normal pigmentation, warm/dry








 May 25, 2018 15:31











Landon Lopez M.D. May 26, 2018 14:19

## 2018-05-26 NOTE — PULMONOLOGY PROGRESS NOTE
Assessment/Plan


Problems:  


(1) Community acquired bacterial pneumonia


(2) Cavitary pneumonia


(3) Sepsis due to pneumonia


(4) Tobacco abuse


Assessment/Plan


ASSESSMENT: 


* 4 cm RLL cavitary mass with R hilar LAD, CAP vs neoplasm


* Extensive daily smoker


* B GGO's and CM


* Periorbital Xanthomas





PLAN: 


* R/O TB with AFB x 3


* AWAIT SPUTUM PCR


* F/U sputum Cx


* F/U Atypical infectious and inflammatory serologies  


* Zosyn (D4) per ID


* PRN HHN's


* Will hold off on CTGBx ----> Discussed with patient, will complete course of 

Abx then will get PET/CT in 4 weeks @ [facility]


* Optimize pulmonary hygiene/mobilize as tolerated


* Nicotine patch


* Discuss tobacco cessation


* Monitor volumes


* Outpatient PFT's


* DVT Px: Hep SQ


* FC


* Can F/U with me in 1-2 weeks after D/C or earlier PRN





Subjective


Allergies:  


Coded Allergies:  


     No Known Allergies (Unverified , 5/21/18)


Subjective


AFVSS, stable on RA


less cough, productive of yellow phlegm


No F, less chills at nights, no N/VD/C





Objective





Last 24 Hour Vital Signs








  Date Time  Temp Pulse Resp B/P (MAP) Pulse Ox O2 Delivery O2 Flow Rate FiO2


 


5/25/18 08:31  84 18   Room Air  21


 


5/25/18 04:00 98.9 73 20 116/66 96   





 98.9       


 


5/25/18 00:00 99.0 83 20 129/71 98   





 99.0       


 


5/24/18 20:00 98.9 82 20 114/69 95   





 98.9       


 


5/24/18 19:30  82 20   Room Air  21


 


5/24/18 16:00 98.0 75 20 96/63 97 Room Air  





 98.0       


 


5/24/18 12:00 97.3 80 20 117/69 100 Room Air  





 97.3       


 


5/24/18 10:12  78 16   Room Air  21

















Intake and Output  


 


 5/24/18 5/25/18





 19:00 07:00


 


Intake Total 350.0 ml 10 ml


 


Balance 350.0 ml 10 ml


 


  


 


Intake Oral 240 ml 10 ml


 


IV Total 110.0 ml 


 


# Voids  1


 


# Bowel Movements  1








General Appearance:  WD/WN, no acute distress


HEENT:  normocephalic, atraumatic, anicteric, mucous membranes moist


Respiratory/Chest:  chest wall non-tender, lungs clear, normal breath sounds, 

no respiratory distress


Cardiovascular:  normal peripheral pulses, normal rate, regular rhythm


Abdomen:  normal bowel sounds, soft, non tender, no organomegaly, non distended


Extremities:  no cyanosis, no clubbing, no edema





Microbiology








 Date/Time


Source Procedure


Growth Status


 


 


 5/22/18 11:50


Sputum Gram Stain - Final Complete


 


 5/22/18 11:50


Sputum Sputum Culture - Final


NORMAL UPPER RESPIRATORY FRANCIE PRESENT Complete





 5/22/18 11:50


Sputum AFB Specimen Processing Tissue - Final Resulted





 5/22/18 11:50


Sputum Acid Fast Bacilli Smear - Final Resulted


 


 5/22/18 11:50


Sputum Acid Fast Bacilli Culture


Pending Resulted











Current Medications








 Medications


  (Trade)  Dose


 Ordered  Sig/Yasmani


 Route


 PRN Reason  Start Time


 Stop Time Status Last Admin


Dose Admin


 


 Acetaminophen


  (Tylenol)  650 mg  Q6H  PRN


 ORAL


 Mild Pain/Temp > 100.5  5/22/18 12:45


 6/21/18 12:44  5/23/18 03:38


 


 


 Acetaminophen/


 Hydrocodone Bitart


  (Norco 10/325)  1 tab  Q4H  PRN


 ORAL


 Moderate Pain (Pain Scale 4-6)  5/22/18 12:45


 5/29/18 12:44  5/24/18 08:25


 


 


 Albuterol/


 Ipratropium


  (Albuterol/


 Ipratropium)  3 ml  Q4H  PRN


 HHN


 Shortness of Breath  5/22/18 08:30


 5/27/18 08:29   


 


 


 Azithromycin


  (Zithromax)  250 mg  DAILY


 ORAL


   5/23/18 09:00


 5/30/18 23:59  5/24/18 08:25


 


 


 Guaifenesin


  (Mucinex ER)  600 mg  TWICE A  DAY


 ORAL


   5/22/18 09:00


 6/21/18 08:59  5/24/18 17:35


 


 


 Guaifenesin


  (Robitussin)  100 mg  Q4H  PRN


 ORAL


 For Cough  5/22/18 08:30


 6/21/18 08:29   


 


 


 Heparin Sodium


  (Porcine)


  (Heparin 5000


 units/ml)  5,000 units  EVERY 12  HOURS


 SUBQ


   5/22/18 09:00


 6/21/18 08:59  5/24/18 20:55


 


 


 Lorazepam


  (Ativan)  2 mg  Q6H  PRN


 ORAL


 For Anxiety  5/22/18 12:15


 5/29/18 12:14   


 


 


 Morphine Sulfate


  (Morphine


 Sulfate)  4 mg  Q4H  PRN


 IVP


 Severe Pain (Pain Scale 7-10)  5/22/18 12:45


 5/29/18 12:44   


 


 


 Nicotine


  (Nicoderm)  1 patch  Q24H


 TDERMAL


   5/22/18 12:00


 6/21/18 11:59  5/24/18 11:17


 


 


 Ondansetron HCl


  (Zofran)  4 mg  Q6H  PRN


 IVP


 Nausea & Vomiting  5/24/18 13:43


 6/23/18 13:42  5/24/18 13:51


 


 


 Piperacillin Sod/


 Tazobactam Sod


 3.375 gm/Sodium


 Chloride  110 ml @ 


 27.5 mls/hr  EVERY 8  HOURS


 IVPB


   5/22/18 14:00


 5/27/18 13:59  5/25/18 05:56


 











Subjective


ROS Limited/Unobtainable:  No


Constitutional:  Reports: no symptoms


HEENT:  Repors: no symptoms


Respiratory:  Reports: no symptoms


Cardiovascular:  Reports: no symptoms


Gastrointestinal/Abdominal:  Reports: no symptoms


Genitourinary:  Reports: no symptoms


Neurologic:  Reports: no symptoms


Psychiatric:  Reports: no symptoms


Skin:  Reports: no symptoms


Endocrine:  Reports: no symptoms


Hematologic:  Reports: no symptoms


Musculoskeletal:  Reports: no symptoms


Allergies:  


Coded Allergies:  


     No Known Allergies (Unverified , 5/21/18)





Objective





Last 24 Hour Vital Signs








  Date Time  Temp Pulse Resp B/P (MAP) Pulse Ox O2 Delivery O2 Flow Rate FiO2


 


5/26/18 16:00 97.8 86 19 117/81 97   





 97.8       


 


5/26/18 12:00 97.5 78 20 120/77 96   





 97.5       


 


5/26/18 09:12  74 20   Room Air  21


 


5/26/18 08:00 97.9 73 20 107/72 95   





 97.9       


 


5/26/18 04:00 98.9  20 114/70 95   





 98.9       


 


5/26/18 00:00 98.4 73 19 113/70 95   





 98.4       


 


5/25/18 20:20  79 18   Room Air  21


 


5/25/18 20:00 98.8 71 20 112/68 95   





 98.8       

















Intake and Output  


 


 5/25/18 5/26/18





 19:00 07:00


 


Intake Total 376.0 ml 110.0 ml


 


Balance 376.0 ml 110.0 ml


 


  


 


Intake Oral 200 ml 


 


IV Total 176.0 ml 110.0 ml


 


# Voids  3











Microbiology








 Date/Time


Source Procedure


Growth Status


 


 


 5/24/18 06:30


Sputum AFB Specimen Processing Tissue - Final Resulted





 5/24/18 06:30


Sputum Acid Fast Bacilli Smear - Final Resulted


 


 5/24/18 06:30


Sputum Acid Fast Bacilli Culture


Pending Resulted








Laboratory Tests


5/26/18 10:30: 


White Blood Count 9.5, Red Blood Count 3.98L, Hemoglobin 12.7L, Hematocrit 37.6L

, Mean Corpuscular Volume 95, Mean Corpuscular Hemoglobin 32.0H, Mean 

Corpuscular Hemoglobin Concent 33.8, Red Cell Distribution Width 10.4L, 

Platelet Count 362, Mean Platelet Volume 7.1, Neutrophils (%) (Auto) 65.9, 

Lymphocytes (%) (Auto) 25.7, Monocytes (%) (Auto) 6.2, Eosinophils (%) (Auto) 

1.8, Basophils (%) (Auto) 0.5, Sodium Level 140, Potassium Level 3.9, Chloride 

Level 106, Carbon Dioxide Level 25, Anion Gap 9, Blood Urea Nitrogen 16, 

Creatinine 1.2, Estimat Glomerular Filtration Rate > 60, Glucose Level 107H, 

Calcium Level 9.2





Current Medications








 Medications


  (Trade)  Dose


 Ordered  Sig/Yasmani


 Route


 PRN Reason  Start Time


 Stop Time Status Last Admin


Dose Admin


 


 Acetaminophen


  (Tylenol)  650 mg  Q6H  PRN


 ORAL


 Mild Pain/Temp > 100.5  5/22/18 12:45


 6/21/18 12:44  5/23/18 03:38


 


 


 Acetaminophen/


 Hydrocodone Bitart


  (Norco 10/325)  1 tab  Q4H  PRN


 ORAL


 Moderate Pain (Pain Scale 4-6)  5/22/18 12:45


 5/29/18 12:44  5/24/18 08:25


 


 


 Albuterol/


 Ipratropium


  (Albuterol/


 Ipratropium)  3 ml  Q4H  PRN


 HHN


 Shortness of Breath  5/22/18 08:30


 5/27/18 08:29   


 


 


 Guaifenesin


  (Mucinex ER)  600 mg  TWICE A  DAY


 ORAL


   5/22/18 09:00


 6/21/18 08:59  5/26/18 17:29


 


 


 Guaifenesin


  (Robitussin)  100 mg  Q4H  PRN


 ORAL


 For Cough  5/22/18 08:30


 6/21/18 08:29   


 


 


 Heparin Sodium


  (Porcine)


  (Heparin 5000


 units/ml)  5,000 units  EVERY 12  HOURS


 SUBQ


   5/22/18 09:00


 6/21/18 08:59  5/26/18 08:33


 


 


 Lorazepam


  (Ativan)  2 mg  Q6H  PRN


 ORAL


 For Anxiety  5/22/18 12:15


 5/29/18 12:14   


 


 


 Morphine Sulfate


  (Morphine


 Sulfate)  4 mg  Q4H  PRN


 IVP


 Severe Pain (Pain Scale 7-10)  5/22/18 12:45


 5/29/18 12:44   


 


 


 Nicotine


  (Nicoderm)  1 patch  Q24H


 TDERMAL


   5/22/18 12:00


 6/21/18 11:59  5/26/18 12:20


 


 


 Ondansetron HCl


  (Zofran)  4 mg  Q6H  PRN


 IVP


 Nausea & Vomiting  5/24/18 13:43


 6/23/18 13:42  5/24/18 13:51


 


 


 Piperacillin Sod/


 Tazobactam Sod


 3.375 gm/Sodium


 Chloride  110 ml @ 


 27.5 mls/hr  EVERY 8  HOURS


 IVPB


   5/25/18 22:00


 5/30/18 21:59  5/26/18 13:54


 

















Maurizio Alba MD May 26, 2018 17:41

## 2018-05-27 VITALS — DIASTOLIC BLOOD PRESSURE: 73 MMHG | SYSTOLIC BLOOD PRESSURE: 107 MMHG

## 2018-05-27 VITALS — SYSTOLIC BLOOD PRESSURE: 118 MMHG | DIASTOLIC BLOOD PRESSURE: 64 MMHG

## 2018-05-27 VITALS — SYSTOLIC BLOOD PRESSURE: 115 MMHG | DIASTOLIC BLOOD PRESSURE: 59 MMHG

## 2018-05-27 VITALS — SYSTOLIC BLOOD PRESSURE: 108 MMHG | DIASTOLIC BLOOD PRESSURE: 68 MMHG

## 2018-05-27 VITALS — DIASTOLIC BLOOD PRESSURE: 76 MMHG | SYSTOLIC BLOOD PRESSURE: 106 MMHG

## 2018-05-27 VITALS — DIASTOLIC BLOOD PRESSURE: 70 MMHG | SYSTOLIC BLOOD PRESSURE: 101 MMHG

## 2018-05-27 RX ADMIN — GUAIFENESIN SCH MG: 600 TABLET, EXTENDED RELEASE ORAL at 17:21

## 2018-05-27 RX ADMIN — DEXTROSE MONOHYDRATE SCH MLS/HR: 50 INJECTION, SOLUTION INTRAVENOUS at 21:40

## 2018-05-27 RX ADMIN — HEPARIN SODIUM SCH UNITS: 5000 INJECTION INTRAVENOUS; SUBCUTANEOUS at 21:42

## 2018-05-27 RX ADMIN — GUAIFENESIN SCH MG: 600 TABLET, EXTENDED RELEASE ORAL at 09:53

## 2018-05-27 RX ADMIN — DEXTROSE MONOHYDRATE SCH MLS/HR: 50 INJECTION, SOLUTION INTRAVENOUS at 14:53

## 2018-05-27 RX ADMIN — HEPARIN SODIUM SCH UNITS: 5000 INJECTION INTRAVENOUS; SUBCUTANEOUS at 09:54

## 2018-05-27 RX ADMIN — DEXTROSE MONOHYDRATE SCH MLS/HR: 50 INJECTION, SOLUTION INTRAVENOUS at 05:58

## 2018-05-27 NOTE — INFECTIOUS DISEASES PROG NOTE
Assessment/Plan


Assessment/Plan








A)


   1) pneumonia, ? neoplasm, ? cap, ? atypical, ? fungal, ? tuberculosis,? post-

obstructive/anaerobic





      - clinically better, no sob


      - leukocytosis and fevers better 


      - tb-spot negative, afb smear negative x 3, tb pcr pending/


      - low suspicion for tuberculosis


      - mycoplasma igm negative, legionella urine antigen negative


      


   2) ? sepsis, leukocytosis, fevers - fevers and leukocytosis better


   3) pmh o/w negative 


   4) allergies - nkda, sh - + smoking, fh-nc, mar noted, notes and records 

noted


   5) d/w RN





P)


   1) zosyn - day # 6 abx, discontinue azithromycin


   2) consider discharge on oral augmentin upon discharge, plan on 10 day total 

of abx since severe pna, augmentin script written


   3) no biopsy for now, f/u ct in future, f/u on labs and serology, check 

tuberculosis pcr


   4) d/w Dr. Abreu 


   5) d/w Ana María Herndon, 


   6) orders noted and entered 


   7) d/w with ICP


   8) consider discontinue isolation, low suspicion for tuberculosis pna, afb 

smear negative x 3, t-spot negative - will d/w primary care and pulmonary





Subjective


Constitutional:  Denies: fever


HEENT:  Denies: congestion


Respiratory:  Denies: shortness of breath


Cardiovascular:  Denies: chest pain


Gastrointestinal/Abdominal:  Denies: nausea, vomiting, diarrhea


Genitourinary:  Denies: dysuria, hematuria, frequency


Neurologic:  Denies: headache


Psychiatric:  Denies: depression


Skin:  Denies: rash


Hematologic:  Denies: bleeding


Musculoskeletal:  Denies: pain


Allergies:  


Coded Allergies:  


     No Known Allergies (Unverified , 5/21/18)





Objective


Vital Signs





Last 24 Hour Vital Signs








  Date Time  Temp Pulse Resp B/P (MAP) Pulse Ox O2 Delivery O2 Flow Rate FiO2


 


5/27/18 12:00 98.0 77 21 115/59 97 Room Air  





 98.0       


 


5/27/18 08:42  72 20   Room Air  21


 


5/27/18 08:00 98.3 73 20 108/68 96 Room Air  





 98.3       


 


5/27/18 04:00 98.3 77 18 106/76 95   





 98.3       


 


5/27/18 04:00      Room Air  


 


5/27/18 00:00      Room Air  


 


5/27/18 00:00 98.2 72 18 101/70 97   





 98.2       


 


5/26/18 20:00      Room Air  


 


5/26/18 20:00 99.1 74 18 115/74 98   





 99.1       


 


5/26/18 19:58  85 20   Room Air  21


 


5/26/18 16:00 97.8 86 19 117/81 97   





 97.8       








Height (Feet):  5


Height (Inches):  8.00


Weight (Pounds):  182


General Appearance:  no acute distress


HEENT:  normocephalic, atraumatic, anicteric, mucous membranes moist


Respiratory/Chest:  lungs clear, normal breath sounds, no accessory muscle use, 

respiratory distress


Cardiovascular:  normal peripheral pulses, normal rate, regular rhythm, no 

gallop/murmur


Abdomen:  normal bowel sounds, soft, non tender, no organomegaly, non distended


Genitourinary:  other - no ruiz


Extremities:  no cyanosis


Skin:  no rash, no lesions


Neurologic/Psychiatric:  CNs II-XII grossly normal, no motor/sensory deficits, 

abnormal gait, alert, oriented x 3, responsive


Lymphatic:  no neck adenopathy


Musculoskeletal:  no effusion


Objective





Chest x-ray - 5/22 - 





Impression: Right midlung mass with subtle central cavitation, also described on


subsequent chest CT. Neoplastic versus inflammatory


 


Hypoventilatory exam with bilateral basilar atelectasis


 





CT chest:





Impression: Suboptimal opacification of the pulmonary arteries, distal emboli 

could


be missed. No evidence of large vessel central pulmonary embolus demonstrated


 


4.5 x 3.9 x 4 cm cavitary mass in the periphery of the right lower lobe. This 

may be


either neoplastic or inflammatory.


 


Right hilar lymphadenopathy, likely related to the above. This could likewise be


neoplastic or inflammatory.


 


Bilateral reticular interstitial and groundglass opacities. Nonspecific, 

suspect on


the basis of pulmonary edema, but could also be infectious or noninfectious


inflammatory


 


Trace right pleural effusion, cm, ? cholelithiasis 


 





Chest x-ray - 5/24 - 





Findings:


 


Cavitary lesion again demonstrated within the right midlung. Heart size is 

normal. No


pleural effusion identified. Bones are unremarkable.


 


IMPRESSION:


 


Cavitary mass in the right midlung. No change





Microbiology








 Date/Time


Source Procedure


Growth Status


 


 


 5/22/18 00:35


Blood Blood Culture - Final


NO GROWTH AFTER 5 DAYS Complete





 5/24/18 06:30


Sputum AFB Specimen Processing Tissue - Final Resulted





 5/24/18 06:30


Sputum Acid Fast Bacilli Smear - Final Resulted


 


 5/24/18 06:30


Sputum Acid Fast Bacilli Culture


Pending Resulted











Labs








Test


  5/25/18


10:00 5/26/18


10:30


 


White Blood Count


  8.1 K/UL


(4.8-10.8) 9.5 K/UL


(4.8-10.8)


 


Red Blood Count


  3.85 M/UL


(4.70-6.10) 3.98 M/UL


(4.70-6.10)


 


Hemoglobin


  12.3 G/DL


(14.2-18.0) 12.7 G/DL


(14.2-18.0)


 


Hematocrit


  36.8 %


(42.0-52.0) 37.6 %


(42.0-52.0)


 


Mean Corpuscular Volume 96 FL (80-99)  95 FL (80-99) 


 


Mean Corpuscular Hemoglobin


  32.0 PG


(27.0-31.0) 32.0 PG


(27.0-31.0)


 


Mean Corpuscular Hemoglobin


Concent 33.5 G/DL


(32.0-36.0) 33.8 G/DL


(32.0-36.0)


 


Red Cell Distribution Width


  10.3 %


(11.6-14.8) 10.4 %


(11.6-14.8)


 


Platelet Count


  346 K/UL


(150-450) 362 K/UL


(150-450)


 


Mean Platelet Volume


  7.4 FL


(6.5-10.1) 7.1 FL


(6.5-10.1)


 


Neutrophils (%) (Auto)


  63.6 %


(45.0-75.0) 65.9 %


(45.0-75.0)


 


Lymphocytes (%) (Auto)


  26.3 %


(20.0-45.0) 25.7 %


(20.0-45.0)


 


Monocytes (%) (Auto)


  7.1 %


(1.0-10.0) 6.2 %


(1.0-10.0)


 


Eosinophils (%) (Auto)


  2.3 %


(0.0-3.0) 1.8 %


(0.0-3.0)


 


Basophils (%) (Auto)


  0.7 %


(0.0-2.0) 0.5 %


(0.0-2.0)


 


Sodium Level


  138 MMOL/L


(136-145) 140 MMOL/L


(136-145)


 


Potassium Level


  3.8 MMOL/L


(3.5-5.1) 3.9 MMOL/L


(3.5-5.1)


 


Chloride Level


  105 MMOL/L


() 106 MMOL/L


()


 


Carbon Dioxide Level


  28 MMOL/L


(21-32) 25 MMOL/L


(21-32)


 


Anion Gap


  6 mmol/L


(5-15) 9 mmol/L


(5-15)


 


Blood Urea Nitrogen


  15 mg/dL


(7-18) 16 mg/dL


(7-18)


 


Creatinine


  1.3 MG/DL


(0.55-1.30) 1.2 MG/DL


(0.55-1.30)


 


Estimat Glomerular Filtration


Rate 57.5 mL/min


(>60) > 60 mL/min


(>60)


 


Glucose Level


  149 MG/DL


() 107 MG/DL


()


 


Calcium Level


  8.9 MG/DL


(8.5-10.1) 9.2 MG/DL


(8.5-10.1)


 


HIV (1&2) Antibody Rapid


  Negative


(NEGATIVE) 


 











Current Medications








 Medications


  (Trade)  Dose


 Ordered  Sig/Yasmani


 Route


 PRN Reason  Start Time


 Stop Time Status Last Admin


Dose Admin


 


 Acetaminophen


  (Tylenol)  650 mg  Q6H  PRN


 ORAL


 Mild Pain/Temp > 100.5  5/22/18 12:45


 6/21/18 12:44  5/23/18 03:38


 


 


 Acetaminophen/


 Hydrocodone Bitart


  (Norco 10/325)  1 tab  Q4H  PRN


 ORAL


 Moderate Pain (Pain Scale 4-6)  5/22/18 12:45


 5/29/18 12:44  5/24/18 08:25


 


 


 Guaifenesin


  (Mucinex ER)  600 mg  TWICE A  DAY


 ORAL


   5/22/18 09:00


 6/21/18 08:59  5/27/18 09:53


 


 


 Guaifenesin


  (Robitussin)  100 mg  Q4H  PRN


 ORAL


 For Cough  5/22/18 08:30


 6/21/18 08:29   


 


 


 Heparin Sodium


  (Porcine)


  (Heparin 5000


 units/ml)  5,000 units  EVERY 12  HOURS


 SUBQ


   5/22/18 09:00


 6/21/18 08:59  5/27/18 09:54


 


 


 Lorazepam


  (Ativan)  2 mg  Q6H  PRN


 ORAL


 For Anxiety  5/22/18 12:15


 5/29/18 12:14   


 


 


 Morphine Sulfate


  (Morphine


 Sulfate)  4 mg  Q4H  PRN


 IVP


 Severe Pain (Pain Scale 7-10)  5/22/18 12:45


 5/29/18 12:44   


 


 


 Nicotine


  (Nicoderm)  1 patch  Q24H


 TDERMAL


   5/22/18 12:00


 6/21/18 11:59  5/26/18 12:20


 


 


 Ondansetron HCl


  (Zofran)  4 mg  Q6H  PRN


 IVP


 Nausea & Vomiting  5/24/18 13:43


 6/23/18 13:42  5/24/18 13:51


 


 


 Piperacillin Sod/


 Tazobactam Sod


 3.375 gm/Sodium


 Chloride  110 ml @ 


 27.5 mls/hr  EVERY 8  HOURS


 IVPB


   5/25/18 22:00


 5/30/18 21:59  5/27/18 05:58


 

















Maggie Gabriel MD May 27, 2018 12:58

## 2018-05-27 NOTE — INTERNAL MED PROGRESS NOTE
Subjective


Physician Name


Landon Lopez


Attending Physician


Julian Abreu M.D.





Current Medications








 Medications


  (Trade)  Dose


 Ordered  Sig/Yasmani


 Route


 PRN Reason  Start Time


 Stop Time Status Last Admin


Dose Admin


 


 Acetaminophen


  (Tylenol)  650 mg  Q6H  PRN


 ORAL


 Mild Pain/Temp > 100.5  5/22/18 12:45


 6/21/18 12:44  5/23/18 03:38


 


 


 Acetaminophen/


 Hydrocodone Bitart


  (Norco 10/325)  1 tab  Q4H  PRN


 ORAL


 Moderate Pain (Pain Scale 4-6)  5/22/18 12:45


 5/29/18 12:44  5/24/18 08:25


 


 


 Guaifenesin


  (Mucinex ER)  600 mg  TWICE A  DAY


 ORAL


   5/22/18 09:00


 6/21/18 08:59  5/27/18 17:21


 


 


 Guaifenesin


  (Robitussin)  100 mg  Q4H  PRN


 ORAL


 For Cough  5/22/18 08:30


 6/21/18 08:29   


 


 


 Heparin Sodium


  (Porcine)


  (Heparin 5000


 units/ml)  5,000 units  EVERY 12  HOURS


 SUBQ


   5/22/18 09:00


 6/21/18 08:59  5/27/18 09:54


 


 


 Lorazepam


  (Ativan)  2 mg  Q6H  PRN


 ORAL


 For Anxiety  5/22/18 12:15


 5/29/18 12:14   


 


 


 Morphine Sulfate


  (Morphine


 Sulfate)  4 mg  Q4H  PRN


 IVP


 Severe Pain (Pain Scale 7-10)  5/22/18 12:45


 5/29/18 12:44   


 


 


 Nicotine


  (Nicoderm)  1 patch  Q24H


 TDERMAL


   5/22/18 12:00


 6/21/18 11:59  5/27/18 13:03


 


 


 Ondansetron HCl


  (Zofran)  4 mg  Q6H  PRN


 IVP


 Nausea & Vomiting  5/24/18 13:43


 6/23/18 13:42  5/24/18 13:51


 


 


 Piperacillin Sod/


 Tazobactam Sod


 3.375 gm/Sodium


 Chloride  110 ml @ 


 27.5 mls/hr  EVERY 8  HOURS


 IVPB


   5/25/18 22:00


 5/30/18 21:59  5/27/18 14:53


 








Allergies:  


Coded Allergies:  


     No Known Allergies (Unverified , 5/21/18)





Objective





Last Vital Signs








  Date Time  Temp Pulse Resp B/P (MAP) Pulse Ox O2 Delivery O2 Flow Rate FiO2


 


5/27/18 16:06 98.1 74 20 107/73 99 Room Air  





 98.1       


 


5/27/18 08:42        21

















Intake and Output  


 


 5/26/18 5/27/18





 19:00 07:00


 


Intake Total 1092.5 ml 497.5 ml


 


Output Total  1300 ml


 


Balance 1092.5 ml -802.5 ml


 


  


 


Intake Oral 900 ml 360 ml


 


IV Total 192.5 ml 137.5 ml


 


Output Urine Total  1300 ml


 


# Voids 1 











Assessment/Plan


Assessment/Plan


   


 Assessment/Plan 


Assessment/Plan


Problem List:  


(1) Tobacco abuse


ICD Codes:  Z72.0 - Tobacco use


SNOMED:  792165412


(2) Cavitary pneumonia


ICD Codes:  J18.9 - Pneumonia, unspecified organism; J98.4 - Other disorders of 

lung


SNOMED:  253339298


(3) Sepsis due to pneumonia


ICD Codes:  J18.9 - Pneumonia, unspecified organism; A41.9 - Sepsis, 

unspecified organism


SNOMED:  47934106, [id number]


Status:  stable, progressing


Assessment/Plan


- Pulmonology and ID following, appreciate rec's 


- repeat CXR on 5/24 shows cavitary mass on right lung region, no change


- s/p IV vancomycin (5/22-5/24). Continue IV zosyn and azithromycin (5/22 - )


- sputum cx no growth at 48 hours. will dc IV vancomycin per discussion with ID 


- AFB x 3, r/o TB. AFB x3 negative


- airborne precautions


- f/u blood cx - NGTD


- atypical serologies sent: legionella, cryptococcus, coccidio, histoplasma, 

mycoplasma, M. pneumoniae, Aspergillus


- f/u quantiferon gold -- negative


- duonebs prn 


- Nicotine patch 


- IVF 


- TTE 60-65%


- trops negative. EKG NSR. ACS ruled out. Right-sided chest pain likely 2/2 

pneumonia 


- pain and supportive care 


- Patient refused CT-guided bx to evaluate the mass. Patient prefers to have 

this done after the infection resolves. Patient agrees to have a PET/CT in 4 

weeks with close f/u with Dr. Abreu in 1-2 weeks after hospital discharge 








 


DVT Prophylaxis:   SCD, HSQ


Code Status:            Full


Hospital Classification Declaration: Based on this initial evaluation, and 

depending on the patient's clinical course, I anticipate that this patient will 

require hospitalization for 4-5 days for sepsis, atypical pneumonia and close 

respiratory/hemodynamic monitoring.





Disposition: Once the patient is stable to leave the hospital, I anticipate the 

patient will likely be discharged to the following environment: home with HH





I spent 41 minutes on this patient's case, and 23 minutes were dedicated to 

counseling and/or care coordination. Discussed with patient/family, nursing 

staff, SW/CM, ID, and pulmonologist regarding clinical status, treatment course

, and disposition planning.





Thank you, Dr. Abreu, for this consultation.





Time of note may not reflect time of encounter.





 Subjective 


Subjective


Date patient seen:  May 25, 2018


Time patient seen:  11:00


Allergies:  


Coded Allergies:  


     No Known Allergies (Unverified , 5/21/18)


Subjective


- no acute events overnight


- AFB smears negative x3


- patient frustrated and requesting to go home 


- denies sob, cp. AF, HDS


pending clearance by health department





 Objective 


Objective





Last 24 Hour Vital Signs








  Date Time  Temp Pulse Resp B/P (MAP) Pulse Ox O2 Delivery O2 Flow Rate FiO2


 


5/25/18 12:00 98.0 78 20 123/66 97   





 98.0       


 


5/25/18 08:31  84 18   Room Air  21


 


5/25/18 08:00 97.2 80 20 100/65 96   





 97.2       


 


5/25/18 04:00 98.9 73 20 116/66 96   





 98.9       


 


5/25/18 00:00 99.0 83 20 129/71 98   





 99.0       


 


5/24/18 20:00 98.9 82 20 114/69 95   





 98.9       


 


5/24/18 19:30  82 20   Room Air  21


 


5/24/18 16:00 98.0 75 20 96/63 97 Room Air  





 98.0       

















Intake and Output  


 


 5/24/18 5/25/18





 19:00 07:00


 


Intake Total 350.0 ml 37.5 ml


 


Balance 350.0 ml 37.5 ml


 


  


 


Intake Oral 240 ml 10 ml


 


IV Total 110.0 ml 27.5 ml


 


# Voids  1


 


# Bowel Movements  1








Laboratory Tests


5/25/18 10:00: 


White Blood Count 8.1, Red Blood Count 3.85L, Hemoglobin 12.3L, Hematocrit 36.8L

, Mean Corpuscular Volume 96, Mean Corpuscular Hemoglobin 32.0H, Mean 

Corpuscular Hemoglobin Concent 33.5, Red Cell Distribution Width 10.3L, 

Platelet Count 346, Mean Platelet Volume 7.4, Neutrophils (%) (Auto) 63.6, 

Lymphocytes (%) (Auto) 26.3, Monocytes (%) (Auto) 7.1, Eosinophils (%) (Auto) 

2.3, Basophils (%) (Auto) 0.7, Sodium Level 138, Potassium Level 3.8, Chloride 

Level 105, Carbon Dioxide Level 28, Anion Gap 6, Blood Urea Nitrogen 15, 

Creatinine 1.3, Estimat Glomerular Filtration Rate 57.5, Glucose Level 149H, 

Calcium Level 8.9, HIV (1&2) Antibody Rapid Negative


Height (Feet):  5


Height (Inches):  8.00


Weight (Pounds):  182


General Appearance:  no apparent distress, alert


EENT:  PERRL/EOMI, normal ENT inspection


Neck:  non-tender, normal alignment, supple


Cardiovascular:  normal peripheral pulses, normal rate, regular rhythm


Respiratory/Chest:  chest wall non-tender, lungs clear, normal breath sounds


Abdomen:  normal bowel sounds, non tender, soft


Extremities:  normal range of motion, non-tender


Neurologic:  CNs II-XII grossly normal, no motor/sensory deficits, alert, 

oriented x 3


Skin:  normal pigmentation, warm/dry








 May 25, 2018 15:31











Landon Lopez M.D. May 27, 2018 18:17

## 2018-05-28 VITALS — DIASTOLIC BLOOD PRESSURE: 70 MMHG | SYSTOLIC BLOOD PRESSURE: 105 MMHG

## 2018-05-28 VITALS — SYSTOLIC BLOOD PRESSURE: 114 MMHG | DIASTOLIC BLOOD PRESSURE: 71 MMHG

## 2018-05-28 VITALS — DIASTOLIC BLOOD PRESSURE: 68 MMHG | SYSTOLIC BLOOD PRESSURE: 102 MMHG

## 2018-05-28 VITALS — DIASTOLIC BLOOD PRESSURE: 57 MMHG | SYSTOLIC BLOOD PRESSURE: 108 MMHG

## 2018-05-28 LAB
ADD MANUAL DIFF: NO
ANION GAP SERPL CALC-SCNC: 10 MMOL/L (ref 5–15)
BASOPHILS NFR BLD AUTO: 0.8 % (ref 0–2)
BUN SERPL-MCNC: 19 MG/DL (ref 7–18)
CALCIUM SERPL-MCNC: 9.1 MG/DL (ref 8.5–10.1)
CHLORIDE SERPL-SCNC: 106 MMOL/L (ref 98–107)
CO2 SERPL-SCNC: 23 MMOL/L (ref 21–32)
CREAT SERPL-MCNC: 1.2 MG/DL (ref 0.55–1.3)
EOSINOPHIL NFR BLD AUTO: 3.5 % (ref 0–3)
ERYTHROCYTE [DISTWIDTH] IN BLOOD BY AUTOMATED COUNT: 11 % (ref 11.6–14.8)
HCT VFR BLD CALC: 39.1 % (ref 42–52)
HGB BLD-MCNC: 13.2 G/DL (ref 14.2–18)
LYMPHOCYTES NFR BLD AUTO: 32.7 % (ref 20–45)
MCV RBC AUTO: 95 FL (ref 80–99)
MONOCYTES NFR BLD AUTO: 7.2 % (ref 1–10)
NEUTROPHILS NFR BLD AUTO: 55.9 % (ref 45–75)
PLATELET # BLD: 356 K/UL (ref 150–450)
POTASSIUM SERPL-SCNC: 4.3 MMOL/L (ref 3.5–5.1)
RBC # BLD AUTO: 4.11 M/UL (ref 4.7–6.1)
SODIUM SERPL-SCNC: 139 MMOL/L (ref 136–145)
WBC # BLD AUTO: 8.5 K/UL (ref 4.8–10.8)

## 2018-05-28 RX ADMIN — GUAIFENESIN SCH MG: 600 TABLET, EXTENDED RELEASE ORAL at 09:15

## 2018-05-28 RX ADMIN — HEPARIN SODIUM SCH UNITS: 5000 INJECTION INTRAVENOUS; SUBCUTANEOUS at 09:30

## 2018-05-28 RX ADMIN — DEXTROSE MONOHYDRATE SCH MLS/HR: 50 INJECTION, SOLUTION INTRAVENOUS at 05:02

## 2018-05-28 RX ADMIN — DEXTROSE MONOHYDRATE SCH MLS/HR: 50 INJECTION, SOLUTION INTRAVENOUS at 14:00

## 2018-05-28 NOTE — PULMONOLOGY PROGRESS NOTE
Assessment/Plan


Assessment/Plan


Patient seen on 5/27/2018





Assessment/Plan


Problems:  


(1) Community acquired bacterial pneumonia


(2) Cavitary pneumonia


(3) Sepsis due to pneumonia


(4) Tobacco abuse


Assessment/Plan


ASSESSMENT: 


* 4 cm RLL cavitary mass with R hilar LAD, CAP vs neoplasm


* Extensive daily smoker


* B GGO's and CM


* Periorbital Xanthomas





PLAN: 


* R/O TB with AFB x 3


* AWAIT SPUTUM PCR


* F/U sputum Cx


* F/U Atypical infectious and inflammatory serologies  


* Zosyn (D4) per ID


* PRN HHN's


* Will hold off on CTGBx ----> Discussed with patient, will complete course of 

Abx then will get PET/CT in 4 weeks @ [facility]


* Optimize pulmonary hygiene/mobilize as tolerated


* Nicotine patch


* Discuss tobacco cessation


* Monitor volumes


* Outpatient PFT's


* DVT Px: Hep SQ


* FC


* Can F/U with me in 1-2 weeks after D/C or earlier PRN





Subjective


Allergies:  


Coded Allergies:  


     No Known Allergies (Unverified , 5/21/18)


Subjective


AFVSS, stable on RA


less cough, productive of yellow phlegm


No F, less chills at nights, no N/VD/C





Objective





Last 24 Hour Vital Signs








  Date Time  Temp Pulse Resp B/P (MAP) Pulse Ox O2 Delivery O2 Flow Rate FiO2


 


5/25/18 08:31  84 18   Room Air  21


 


5/25/18 04:00 98.9 73 20 116/66 96   





 98.9       


 


5/25/18 00:00 99.0 83 20 129/71 98   





 99.0       


 


5/24/18 20:00 98.9 82 20 114/69 95   





 98.9       


 


5/24/18 19:30  82 20   Room Air  21


 


5/24/18 16:00 98.0 75 20 96/63 97 Room Air  





 98.0       


 


5/24/18 12:00 97.3 80 20 117/69 100 Room Air  





 97.3       


 


5/24/18 10:12  78 16   Room Air  21

















Intake and Output  


 


 5/24/18 5/25/18





 19:00 07:00


 


Intake Total 350.0 ml 10 ml


 


Balance 350.0 ml 10 ml


 


  


 


Intake Oral 240 ml 10 ml


 


IV Total 110.0 ml 


 


# Voids  1


 


# Bowel Movements  1








General Appearance:  WD/WN, no acute distress


HEENT:  normocephalic, atraumatic, anicteric, mucous membranes moist


Respiratory/Chest:  chest wall non-tender, lungs clear, normal breath sounds, 

no respiratory distress


Cardiovascular:  normal peripheral pulses, normal rate, regular rhythm


Abdomen:  normal bowel sounds, soft, non tender, no organomegaly, non distended


Extremities:  no cyanosis, no clubbing, no edema





Microbiology








 Date/Time


Source Procedure


Growth Status


 


 


 5/22/18 11:50


Sputum Gram Stain - Final Complete


 


 5/22/18 11:50


Sputum Sputum Culture - Final


NORMAL UPPER RESPIRATORY FRANCIE PRESENT Complete





 5/22/18 11:50


Sputum AFB Specimen Processing Tissue - Final Resulted





 5/22/18 11:50


Sputum Acid Fast Bacilli Smear - Final Resulted


 


 5/22/18 11:50


Sputum Acid Fast Bacilli Culture


Pending Resulted











Current Medications








 Medications


  (Trade)  Dose


 Ordered  Sig/Yasmani


 Route


 PRN Reason  Start Time


 Stop Time Status Last Admin


Dose Admin


 


 Acetaminophen


  (Tylenol)  650 mg  Q6H  PRN


 ORAL


 Mild Pain/Temp > 100.5  5/22/18 12:45


 6/21/18 12:44  5/23/18 03:38


 


 


 Acetaminophen/


 Hydrocodone Bitart


  (Norco 10/325)  1 tab  Q4H  PRN


 ORAL


 Moderate Pain (Pain Scale 4-6)  5/22/18 12:45


 5/29/18 12:44  5/24/18 08:25


 


 


 Albuterol/


 Ipratropium


  (Albuterol/


 Ipratropium)  3 ml  Q4H  PRN


 HHN


 Shortness of Breath  5/22/18 08:30


 5/27/18 08:29   


 


 


 Azithromycin


  (Zithromax)  250 mg  DAILY


 ORAL


   5/23/18 09:00


 5/30/18 23:59  5/24/18 08:25


 


 


 Guaifenesin


  (Mucinex ER)  600 mg  TWICE A  DAY


 ORAL


   5/22/18 09:00


 6/21/18 08:59  5/24/18 17:35


 


 


 Guaifenesin


  (Robitussin)  100 mg  Q4H  PRN


 ORAL


 For Cough  5/22/18 08:30


 6/21/18 08:29   


 


 


 Heparin Sodium


  (Porcine)


  (Heparin 5000


 units/ml)  5,000 units  EVERY 12  HOURS


 SUBQ


   5/22/18 09:00


 6/21/18 08:59  5/24/18 20:55


 


 


 Lorazepam


  (Ativan)  2 mg  Q6H  PRN


 ORAL


 For Anxiety  5/22/18 12:15


 5/29/18 12:14   


 


 


 Morphine Sulfate


  (Morphine


 Sulfate)  4 mg  Q4H  PRN


 IVP


 Severe Pain (Pain Scale 7-10)  5/22/18 12:45


 5/29/18 12:44   


 


 


 Nicotine


  (Nicoderm)  1 patch  Q24H


 TDERMAL


   5/22/18 12:00


 6/21/18 11:59  5/24/18 11:17


 


 


 Ondansetron HCl


  (Zofran)  4 mg  Q6H  PRN


 IVP


 Nausea & Vomiting  5/24/18 13:43


 6/23/18 13:42  5/24/18 13:51


 


 


 Piperacillin Sod/


 Tazobactam Sod


 3.375 gm/Sodium


 Chloride  110 ml @ 


 27.5 mls/hr  EVERY 8  HOURS


 IVPB


   5/22/18 14:00


 5/27/18 13:59  5/25/18 05:56


 








Patient seen on 5/27/2018





Subjective


Allergies:  


Coded Allergies:  


     No Known Allergies (Unverified , 5/21/18)





Objective





Last 24 Hour Vital Signs








  Date Time  Temp Pulse Resp B/P (MAP) Pulse Ox O2 Delivery O2 Flow Rate FiO2


 


5/28/18 08:00 97.5 64 20 105/70 95 Room Air  





 97.5       


 


5/28/18 04:00 98.4 62 19 102/68 95   





 98.4       


 


5/28/18 00:00 98.6 62 19 108/57 100   





 98.6       


 


5/27/18 20:00 98.9 73 20 118/64 95   





 98.9       


 


5/27/18 19:14  77 20   Room Air  21


 


5/27/18 16:06 98.1 74 20 107/73 99 Room Air  





 98.1       


 


5/27/18 12:00 98.0 77 21 115/59 97 Room Air  





 98.0       

















Intake and Output  


 


 5/27/18 5/28/18





 19:00 07:00


 


Intake Total 945.0 ml 


 


Balance 945.0 ml 


 


  


 


Intake Oral 780 ml 


 


IV Total 165.0 ml 


 


# Voids  3


 


# Bowel Movements  1








Laboratory Tests


5/28/18 06:45: 


White Blood Count 8.5, Red Blood Count 4.11L, Hemoglobin 13.2L, Hematocrit 39.1L

, Mean Corpuscular Volume 95, Mean Corpuscular Hemoglobin 32.0H, Mean 

Corpuscular Hemoglobin Concent 33.6, Red Cell Distribution Width 11.0L, 

Platelet Count 356, Mean Platelet Volume 7.1, Neutrophils (%) (Auto) 55.9, 

Lymphocytes (%) (Auto) 32.7, Monocytes (%) (Auto) 7.2, Eosinophils (%) (Auto) 

3.5H, Basophils (%) (Auto) 0.8, Sodium Level 139, Potassium Level 4.3, Chloride 

Level 106, Carbon Dioxide Level 23, Anion Gap 10, Blood Urea Nitrogen 19H, 

Creatinine 1.2, Estimat Glomerular Filtration Rate > 60, Glucose Level 101, 

Calcium Level 9.1





Current Medications








 Medications


  (Trade)  Dose


 Ordered  Sig/Yasmani


 Route


 PRN Reason  Start Time


 Stop Time Status Last Admin


Dose Admin


 


 Acetaminophen


  (Tylenol)  650 mg  Q6H  PRN


 ORAL


 Mild Pain/Temp > 100.5  5/22/18 12:45


 6/21/18 12:44  5/23/18 03:38


 


 


 Acetaminophen/


 Hydrocodone Bitart


  (Norco 10/325)  1 tab  Q4H  PRN


 ORAL


 Moderate Pain (Pain Scale 4-6)  5/22/18 12:45


 5/29/18 12:44  5/24/18 08:25


 


 


 Guaifenesin


  (Mucinex ER)  600 mg  TWICE A  DAY


 ORAL


   5/22/18 09:00


 6/21/18 08:59  5/28/18 09:15


 


 


 Guaifenesin


  (Robitussin)  100 mg  Q4H  PRN


 ORAL


 For Cough  5/22/18 08:30


 6/21/18 08:29   


 


 


 Heparin Sodium


  (Porcine)


  (Heparin 5000


 units/ml)  5,000 units  EVERY 12  HOURS


 SUBQ


   5/22/18 09:00


 6/21/18 08:59  5/28/18 09:30


 


 


 Lorazepam


  (Ativan)  2 mg  Q6H  PRN


 ORAL


 For Anxiety  5/22/18 12:15


 5/29/18 12:14   


 


 


 Morphine Sulfate


  (Morphine


 Sulfate)  4 mg  Q4H  PRN


 IVP


 Severe Pain (Pain Scale 7-10)  5/22/18 12:45


 5/29/18 12:44   


 


 


 Nicotine


  (Nicoderm)  1 patch  Q24H


 TDERMAL


   5/22/18 12:00


 6/21/18 11:59  5/27/18 13:03


 


 


 Ondansetron HCl


  (Zofran)  4 mg  Q6H  PRN


 IVP


 Nausea & Vomiting  5/24/18 13:43


 6/23/18 13:42  5/24/18 13:51


 


 


 Piperacillin Sod/


 Tazobactam Sod


 3.375 gm/Sodium


 Chloride  110 ml @ 


 27.5 mls/hr  EVERY 8  HOURS


 IVPB


   5/25/18 22:00


 5/30/18 21:59  5/28/18 05:02


 

















Maurizio Alba MD May 28, 2018 10:36

## 2018-05-28 NOTE — DISCHARGE INSTRUCTIONS
Discharge Instructions


Discharge Instructions


Follow Up Orders


F/u with Dr. Abreu in 1-2 weeks for CT-guided biopsy





For Congestive Heart Failure


Reminder


Report to your physician any weight gain of 5 pounds or more in one week.











Ana María Herndon NP May 28, 2018 14:25

## 2018-05-29 NOTE — DISCHARGE SUMMARY
Discharge Summary


Discharge Summary


_


DATE OF ADMISSION: 05/22/2018





DATE OF DISCHARGE: 05/28/2018





REASON FOR ADMISSION: 


53 years old male without significant past medical history presented to 

emergency department with complaint of right-sided chest pain.  Patient 

reported cough and congestion for the last 10 days.  Patient seen outpatient 

physician who started  him on  Levaquin,  Motrin and  antitussive.  However 

patient reported not getting better.   Cough was productive. Pain reported as  

sharp, nonradiating, 10 out of 10. Pain occurred with inspiration or coughing.  


He denied fever or chills. He admitted to smoking.


Upon evaluation in emergency room patient was febrile.  Laboratory workup 

revealed leukocytosis, WBC 14.6.  Troponin negative.  EKG revealed normal sinus 

rhythm, no acute ischemic changes  Chest x-ray revealed right mid lung mass 

with subtle central cavitation.  Neoplastic versus inflammatory.  CTA of the 

chest revealed no evidence of large central pulmonary emboli.  4.5 x 3.9 x 4 cm 

cavitary mass in the periphery of the right lower lobe.  Neoplastic versus 

inflammatory.  Right hilar lymphadenopathy.  Bilateral reticular interstitial 

and groundglass opacities.  Patient was started on Rocephin and azithromycin.  

Patient admitted for further management with diagnosis off community-acquired 

bacterial pneumonia, cavitary pneumonia, sepsis secondary to pneumonia, tobacco 

abuse





CONSULTANTS:


pulmonary Dr. Abreu


ID specialist   


 


 


[facility] COURSE: 


Patient admitted.  Patient was kept on airborne isolation.  Infectious disease 

specialist and pulmonologist followed. 





Supplemental oxygen provided as needed to keep pulse oximetry above 92%.  

Pulmonary toilet provided.  Patient was started on antibiotics 


as per ID specialist management. Sputum culture revealed no growth.  Atypical 

and inflammatory serology  sent. Blood culture negative.  


Sputum AFB 3 were negative.   TB spot negative.  HIV test was negative. ADRIAN 

and ANCA  negative. Follow-up with pending serology  


Nicotine patch started. Antitussive provided as needed. Patient  was strongly 

encouraged and counseled on smoking cessation.  Supportive care provided.  








Second troponin negative.  EKG revealed no acute ischemic changes . Patient was 

ruled out for acute MI.  Echocardiogram revealed preserved ejection fraction of 

60-65% with normal wall motion. Lipid panel within normal limits.  Right-sided 

chest pain was likely secondary to pneumonia.  Pain management was addressed,  

pain was controlled








Patient declined CT guided biopsy to evaluate the mass.  Patient preferred to 

have the test done after infection resolved.  Per pulmonologist,complete the 

course of antibiotics and follow-up with   pulmonologist in one week after 

discharge.  Patient will have PET/CT  in 4 weeks with close follow-up with  

pulmonologist.   Per ID specialist, isolation was discontinued due to  low 

suspicion for tuberculosis,  given the negative AFB smear x 3 and negative TB 

spot.





DVT and GI prophylaxis provided. Supportive care provided.  


Patient was stable  for discharge home, complete the course of antibiotics, 

prescription provided.


 











 


FINAL DIAGNOSES: 


Sepsis secondary to pneumonia


Community-acquired bacterial pneumonia


Cavitary pneumonia


Tobacco abuse





DISCHARGE MEDICATIONS:


See Medication Reconciliation list.





DISCHARGE INSTRUCTIONS:


Patient was discharged home.  Follow up with pulmonologist in one week.  

Complete the course of antibiotics.  PET/CT to be done in 4 weeks.





I have been assigned to dictate discharge summary for this account. I was not 

involved in the patient's management.











Peggy Mcadams NP May 29, 2018 08:04

## 2018-05-29 NOTE — GENERAL PROGRESS NOTE
Assessment/Plan


Problem List:  


(1) Tobacco abuse


ICD Codes:  Z72.0 - Tobacco use


SNOMED:  065103722


(2) Cavitary pneumonia


ICD Codes:  J18.9 - Pneumonia, unspecified organism; J98.4 - Other disorders of 

lung


SNOMED:  130797594


(3) Sepsis due to pneumonia


ICD Codes:  J18.9 - Pneumonia, unspecified organism; A41.9 - Sepsis, 

unspecified organism


SNOMED:  81698361, [id number]


Assessment/Plan


- Pulmonology and ID following, appreciate rec's 


- repeat CXR on 5/24 shows cavitary mass on right lung region, no change


- s/p IV vancomycin (5/22-5/24). Continue IV zosyn and azithromycin (5/22 - )


- sputum cx no growth at 48 hours. will dc IV vancomycin per discussion with ID 


- AFB x 3 negative. AFB PCR x 1 negative. 


- airborne precautions


- f/u blood cx - NGTD


- atypical serologies sent: legionella, cryptococcus, coccidio, histoplasma, 

mycoplasma, M. pneumoniae, Aspergillus


- f/u quantiferon gold -- negative


- duonebs prn 


- Nicotine patch 


- IVF 


- TTE 60-65%


- trops negative. EKG NSR. ACS ruled out. Right-sided chest pain likely 2/2 

pneumonia 


- lipid panel, A1c


- pain and supportive care 


- Patient refused CT-guided bx to evaluate the mass. Patient prefers to have 

this done after the infection resolves. Patient agrees to have a PET/CT in 4 

weeks with close f/u with Dr. Abreu in 1-2 weeks after hospital discharge 


- okay to discharge home with PO augmentin x 5 days. cleared per ID and health 

department. 





 


DVT Prophylaxis:   SCD, HSQ


Code Status:            Full


Hospital Classification Declaration: Based on this initial evaluation, and 

depending on the patient's clinical course, I anticipate that this patient will 

require hospitalization for 4-5 days for sepsis, atypical pneumonia and close 

respiratory/hemodynamic monitoring.





Disposition: Once the patient is stable to leave the hospital, I anticipate the 

patient will likely be discharged to the following environment: home with HH





I spent 41 minutes on this patient's case, and 23 minutes were dedicated to 

counseling and/or care coordination. Discussed with patient/family, nursing 

staff, SW/CM, ID, and pulmonologist regarding clinical status, treatment course

, and disposition planning.





Thank you, Dr. Abreu, for this consultation.





Time of note may not reflect time of encounter.





Subjective


Date patient seen:  May 28, 2018


Time patient seen:  12:00


Allergies:  


Coded Allergies:  


     No Known Allergies (Unverified , 5/21/18)


Subjective


- AFB smears all three negative and PCR negative


- cleared per ID and okay to dc per the health department 


- doing well. no cp, sob





Objective


Height (Feet):  5


Height (Inches):  8.00


Weight (Pounds):  182


General Appearance:  no apparent distress, alert


EENT:  PERRL/EOMI, normal ENT inspection


Neck:  non-tender, normal alignment, supple


Cardiovascular:  normal peripheral pulses, normal rate, regular rhythm


Respiratory/Chest:  chest wall non-tender, lungs clear, normal breath sounds


Abdomen:  normal bowel sounds, non tender, soft


Neurologic:  CNs II-XII grossly normal, no motor/sensory deficits, alert, 

oriented x 3


Skin:  normal pigmentation, warm/dry











Ana María Herndon NP May 29, 2018 18:21